# Patient Record
Sex: MALE | Race: WHITE | NOT HISPANIC OR LATINO | Employment: OTHER | URBAN - METROPOLITAN AREA
[De-identification: names, ages, dates, MRNs, and addresses within clinical notes are randomized per-mention and may not be internally consistent; named-entity substitution may affect disease eponyms.]

---

## 2022-03-31 ENCOUNTER — TELEPHONE (OUTPATIENT)
Dept: NEUROSURGERY | Facility: CLINIC | Age: 67
End: 2022-03-31

## 2022-03-31 NOTE — TELEPHONE ENCOUNTER
Called patient to schedule NP appointment with Beatrice Dumont per referral from Dr. Hoyos.  No answer.  Left voicemail.

## 2022-04-29 ENCOUNTER — OFFICE VISIT (OUTPATIENT)
Dept: NEUROSURGERY | Facility: CLINIC | Age: 67
End: 2022-04-29
Payer: MEDICARE

## 2022-04-29 VITALS — WEIGHT: 227.06 LBS | HEART RATE: 67 BPM | HEIGHT: 68 IN | RESPIRATION RATE: 18 BRPM | BODY MASS INDEX: 34.41 KG/M2

## 2022-04-29 DIAGNOSIS — M54.16 LUMBAR RADICULOPATHY, CHRONIC: ICD-10-CM

## 2022-04-29 DIAGNOSIS — Z98.890 HISTORY OF LUMBAR LAMINECTOMY: Primary | ICD-10-CM

## 2022-04-29 DIAGNOSIS — Z98.1 HISTORY OF LUMBAR SPINAL FUSION: ICD-10-CM

## 2022-04-29 PROCEDURE — 99204 OFFICE O/P NEW MOD 45 MIN: CPT | Mod: S$PBB,,, | Performed by: PHYSICIAN ASSISTANT

## 2022-04-29 PROCEDURE — 99204 PR OFFICE/OUTPT VISIT, NEW, LEVL IV, 45-59 MIN: ICD-10-PCS | Mod: S$PBB,,, | Performed by: PHYSICIAN ASSISTANT

## 2022-04-29 NOTE — PROGRESS NOTES
[facility]  Clinic Consult     Consult Requested By: Angelito Vasquez  PCP: Boaz Hoyos DO    SUBJECTIVE:     Chief Complaint:   Chief Complaint   Patient presents with    Back Pain     LBP pain right side of back radiating down right leg. Left leg numb to the foot; left leg gives away causing falls.       History of Present Illness:  Chau Price is a 66 y.o. male with HTN who presents for evaluation of back and leg pain. Patient has a history of cervical fusion x2 and lumbar surgery x3. He reports he initially had a lumbar laminectomy. He was discharged same day and fell at home that night. He required a revision surgery the following day. He underwent lumbar fusion approximately 1.5 years ago. He believes it is L4-5. He reports he underwent stand alone ALIF. He reports prior to this surgery he was experiencing left leg symptoms. He continues to have numbness in the left leg, mostly the foot since before this surgery. He now experiences low back pain with radiation into bilateral legs. The leg pain is worse at night and will wake him up several times throughout the night. He reports the pain is tolerable during the day. He reports numbness/tingling in the right leg, mostly foot. He reports his left leg will give out on him. Denies bowel/bladder dysfunction. He is currently attending PT. He reports everything hurts while doing therapy. He takes naproxen and sometimes hydrocodone. He has failed to improve with gabapentin and Lyrica. He last received injections before his most recent surgery.     Pertinent and recent history, provider evaluations, imaging and data reviewed in EPIC        Past Medical History:   Diagnosis Date    Chronic bilateral low back pain without sciatica     Hypertension     Other ulcerative colitis with rectal bleeding      Past Surgical History:   Procedure Laterality Date    BACK SURGERY      ROTATOR CUFF REPAIR       Family  History   Problem Relation Age of Onset    Cancer Mother         lung    Cancer Father         bladder     Social History     Tobacco Use    Smoking status: Former Smoker     Types: Cigars    Smokeless tobacco: Never Used   Substance Use Topics    Alcohol use: Yes     Comment: social      Review of patient's allergies indicates:   Allergen Reactions    Azathioprine        Current Outpatient Medications:     ALPRAZolam (XANAX) 1 MG tablet, alprazolam 1 mg tablet  TAKE 1 TABLET BY MOUTH THREE TIMES DAILY AS NEEDED, Disp: , Rfl:     augmented betamethasone dipropionate (DIPROLENE-AF) 0.05 % cream, betamethasone, augmented 0.05 % topical cream  APPLY AND RUB IN A THIN FILM TO AFFECTED AREAS TWICE DAILY, Disp: , Rfl:     fluticasone propionate (CUTIVATE) 0.05 % cream, fluticasone propionate 0.05 % topical cream  APPLY A VERY THIN LAYER TOPICALLY TO FACE TWICE DAILY AS NEEDED, Disp: , Rfl:     HYDROcodone-acetaminophen (NORCO) 5-325 mg per tablet, Norco 5 mg-325 mg tablet   1 tablet by oral route., Disp: , Rfl:     latanoprost 0.005 % ophthalmic solution, latanoprost 0.005 % eye drops  INSTILL 1 DROP INTO RIGHT EYE EVERY EVENING, Disp: , Rfl:     losartan (COZAAR) 100 MG tablet, losartan 100 mg tablet  TAKE 1 TABLET BY MOUTH EVERY DAY IN THE MORNING, Disp: 90 tablet, Rfl: 4    mesalamine (LIALDA) 1.2 gram TbEC, Take 4 tablets (4.8 g total) by mouth daily with breakfast., Disp: 360 tablet, Rfl: 4    metoprolol tartrate (LOPRESSOR) 50 MG tablet, metoprolol tartrate 50 mg tablet  TAKE 1 TABLET BY MOUTH TWICE DAILY, Disp: 180 tablet, Rfl: 4    naproxen (NAPROSYN) 500 MG tablet, TAKE 1 TABLET BY MOUTH EVERY 12 HOURS AS NEEDED, Disp: 180 tablet, Rfl: 1    predniSONE (DELTASONE) 10 MG tablet, prednisone 10 mg tablet, Disp: , Rfl:     tadalafiL (CIALIS) 5 MG tablet, tadalafil 5 mg tablet  TAKE 1 TABLET BY MOUTH DAILY, Disp: , Rfl:     Review of Systems:   Constitutional: no fever, chills or night sweats. No  "changes in weight   Eyes: no visual changes   ENT: no nasal congestion or sore throat   Respiratory: no cough or shortness of breath   Cardiovascular: no chest pain or palpitations   Gastrointestinal: no nausea or vomiting   Genitourinary: no hematuria or dysuria   Integument/Breast: no rash or pruritis   Hematologic/Lymphatic: no easy bruising or lymphadenopathy   Musculoskeletal: +back pain, leg pain   Neurological: no seizures or tremors +weakness, paresthesias   Behavioral/Psych: no auditory or visual hallucinations   Endocrine: no heat or cold intolerance         OBJECTIVE:     Vital Signs (Most Recent):  Pulse: 67 (04/29/22 1050)  Resp: 18 (04/29/22 1050)  Estimated body mass index is 34.53 kg/m² as calculated from the following:    Height as of this encounter: 5' 8" (1.727 m).    Weight as of this encounter: 103 kg (227 lb 1.2 oz).    Physical Exam:  General: well developed, well nourished, no distress.   Neurologic: Alert and oriented. Thought content appropriate. GCS 15.   Language: No aphasia  Speech: No dysarthria  Head: normocephalic, atraumatic  Eyes: EOMI.  Neck: trachea midline, no JVD   Cardiovascular: no LE edema  Pulmonary: normal respirations, no signs of respiratory distress  Abdomen: soft, non-distended  Sensory: intact to light touch throughout  Skin: Skin is warm, dry and intact.    Motor Strength: Moves all extremities spontaneously with good tone. No abnormal movements seen.     Strength  Deltoids Triceps Biceps Wrist Extension Wrist Flexion Hand  Interossei    Upper: R 5/5 5/5 5/5 5/5 5/5 5/5 5/5     L 5/5 5/5 5/5 5/5 5/5 5/5 5/5      Iliopsoas Quadriceps Knee  Flexion Tibialis  anterior Gastro- cnemius EHL     Lower: R 5/5 5/5 5/5 5/5 5/5 5/5      L 5/5 5/5 5/5 4+/5 5/5 3/5       DTR's: 2 +   Clonus: absent  Gait: normal       Difficulty standing on left heel         Diagnostic Results:  No imaging available for review     ASSESSMENT/PLAN:     History of lumbar laminectomy  -     MRI " Lumbar Spine W WO Contrast; Future; Expected date: 04/29/2022  -     X-Ray Lumbar Spine Ap Lateral w/Flex Ext; Future; Expected date: 04/29/2022  -     Creatinine, serum; Future; Expected date: 04/29/2022    Lumbar radiculopathy, chronic  -     Ambulatory referral/consult to Neurosurgery  -     MRI Lumbar Spine W WO Contrast; Future; Expected date: 04/29/2022  -     X-Ray Lumbar Spine Ap Lateral w/Flex Ext; Future; Expected date: 04/29/2022  -     Creatinine, serum; Future; Expected date: 04/29/2022    History of lumbar spinal fusion  -     MRI Lumbar Spine W WO Contrast; Future; Expected date: 04/29/2022  -     X-Ray Lumbar Spine Ap Lateral w/Flex Ext; Future; Expected date: 04/29/2022  -     Creatinine, serum; Future; Expected date: 04/29/2022        Chau Price is a 66 y.o. male with history of lumbar fusion and decompression with progression of back pain with radiculopathy. He has weakness in the left distal leg. Unsure if this was present before his last surgery. I recommend obtaining MRI lumbar spine to evaluate for adjacent segment disease. I have also ordered dynamic x-rays to evaluate fusion hardware and stability of alignment. I will review once complete and provide further recommendations. I will call patient to discuss.     Patient verbalized understanding of plan. Encouraged to call with any questions or concerns.

## 2022-06-08 ENCOUNTER — OFFICE VISIT (OUTPATIENT)
Dept: NEUROSURGERY | Facility: CLINIC | Age: 67
End: 2022-06-08
Payer: MEDICARE

## 2022-06-08 VITALS
RESPIRATION RATE: 18 BRPM | HEIGHT: 68 IN | HEART RATE: 85 BPM | SYSTOLIC BLOOD PRESSURE: 124 MMHG | DIASTOLIC BLOOD PRESSURE: 94 MMHG | BODY MASS INDEX: 33.34 KG/M2 | WEIGHT: 220 LBS

## 2022-06-08 DIAGNOSIS — M54.16 LUMBAR RADICULOPATHY, CHRONIC: ICD-10-CM

## 2022-06-08 DIAGNOSIS — Z98.1 HISTORY OF LUMBAR SPINAL FUSION: Primary | ICD-10-CM

## 2022-06-08 DIAGNOSIS — Z98.890 HISTORY OF LUMBAR LAMINECTOMY: ICD-10-CM

## 2022-06-08 PROCEDURE — 99214 PR OFFICE/OUTPT VISIT, EST, LEVL IV, 30-39 MIN: ICD-10-PCS | Mod: S$PBB,,, | Performed by: STUDENT IN AN ORGANIZED HEALTH CARE EDUCATION/TRAINING PROGRAM

## 2022-06-08 PROCEDURE — 99214 OFFICE O/P EST MOD 30 MIN: CPT | Mod: S$PBB,,, | Performed by: STUDENT IN AN ORGANIZED HEALTH CARE EDUCATION/TRAINING PROGRAM

## 2022-06-08 NOTE — PROGRESS NOTES
[facility]  Clinic Consult     Consult Requested By: No ref. provider found  PCP: Boaz Hoyos DO    SUBJECTIVE:     Chief Complaint:   Chief Complaint   Patient presents with    Lumbar Spine Pain (L-Spine)     Pain in both legs last night mostly on left leg. Numbness and tingling begins at hip and radiates to toes on left side. Also tingling in specific area on right calf.       History of Present Illness:  Chau Price is a 66 y.o. male with HTN who presents for evaluation of back and leg pain. Patient has a history of cervical fusion x2 and lumbar surgery x3. He reports he initially had a lumbar laminectomy. He was discharged same day and fell at home that night. He required a revision surgery the following day. He underwent lumbar fusion approximately 1.5 years ago. He believes it is L4-5. He reports he underwent stand alone ALIF. He reports prior to this surgery he was experiencing left leg symptoms. He continues to have numbness in the left leg, mostly the foot since before this surgery. He now experiences low back pain with radiation into bilateral legs. The leg pain is worse at night and will wake him up several times throughout the night. He reports the pain is tolerable during the day. He reports numbness/tingling in the right leg, mostly foot. He reports his left leg will give out on him. Denies bowel/bladder dysfunction. He is currently attending PT. He reports everything hurts while doing therapy. He takes naproxen and sometimes hydrocodone. He has failed to improve with gabapentin and Lyrica. He last received injections before his most recent surgery.     Pertinent and recent history, provider evaluations, imaging and data reviewed in EPIC        Past Medical History:   Diagnosis Date    Chronic bilateral low back pain without sciatica     Hypertension     Other ulcerative colitis with rectal bleeding      Past Surgical History:   Procedure  Laterality Date    BACK SURGERY      ROTATOR CUFF REPAIR       Family History   Problem Relation Age of Onset    Cancer Mother         lung    Cancer Father         bladder     Social History     Tobacco Use    Smoking status: Former Smoker     Types: Cigars    Smokeless tobacco: Never Used   Substance Use Topics    Alcohol use: Yes     Comment: social      Review of patient's allergies indicates:   Allergen Reactions    Azathioprine        Current Outpatient Medications:     ALPRAZolam (XANAX) 1 MG tablet, alprazolam 1 mg tablet  TAKE 1 TABLET BY MOUTH THREE TIMES DAILY AS NEEDED, Disp: , Rfl:     augmented betamethasone dipropionate (DIPROLENE-AF) 0.05 % cream, betamethasone, augmented 0.05 % topical cream  APPLY AND RUB IN A THIN FILM TO AFFECTED AREAS TWICE DAILY, Disp: , Rfl:     busPIRone (BUSPAR) 10 MG tablet, Take 10 mg by mouth 2 (two) times a day., Disp: , Rfl:     diphenoxylate-atropine 2.5-0.025 mg (LOMOTIL) 2.5-0.025 mg per tablet, Take 1 tablet by mouth 4 (four) times daily as needed for Diarrhea., Disp: 12 tablet, Rfl: 0    doxazosin (CARDURA) 4 MG tablet, Take 4 mg by mouth every evening., Disp: , Rfl:     fluticasone propionate (CUTIVATE) 0.05 % cream, fluticasone propionate 0.05 % topical cream  APPLY A VERY THIN LAYER TOPICALLY TO FACE TWICE DAILY AS NEEDED, Disp: , Rfl:     hydrALAZINE (APRESOLINE) 25 MG tablet, Take 25 mg by mouth 2 (two) times a day., Disp: , Rfl:     HYDROcodone-acetaminophen (NORCO) 5-325 mg per tablet, Norco 5 mg-325 mg tablet   1 tablet by oral route., Disp: , Rfl:     latanoprost 0.005 % ophthalmic solution, latanoprost 0.005 % eye drops  INSTILL 1 DROP INTO RIGHT EYE EVERY EVENING, Disp: , Rfl:     LORazepam (ATIVAN) 1 MG tablet, Take 1 tablet (1 mg total) by mouth every 8 (eight) hours as needed (withdrawl symptoms)., Disp: 15 tablet, Rfl: 0    losartan (COZAAR) 100 MG tablet, losartan 100 mg tablet  TAKE 1 TABLET BY MOUTH EVERY DAY IN THE MORNING,  Disp: 90 tablet, Rfl: 4    mesalamine (LIALDA) 1.2 gram TbEC, Take 4 tablets (4.8 g total) by mouth daily with breakfast., Disp: 360 tablet, Rfl: 4    metoprolol tartrate (LOPRESSOR) 50 MG tablet, metoprolol tartrate 50 mg tablet  TAKE 1 TABLET BY MOUTH TWICE DAILY, Disp: 180 tablet, Rfl: 4    naproxen (NAPROSYN) 500 MG tablet, TAKE 1 TABLET BY MOUTH EVERY 12 HOURS AS NEEDED, Disp: 180 tablet, Rfl: 1    ondansetron (ZOFRAN-ODT) 4 MG TbDL, Take 1 tablet (4 mg total) by mouth every 6 (six) hours as needed (nausea)., Disp: 20 tablet, Rfl: 0    pantoprazole (PROTONIX) 20 MG tablet, Take 20 mg by mouth once daily., Disp: , Rfl:     predniSONE (DELTASONE) 10 MG tablet, prednisone 10 mg tablet, Disp: , Rfl:     tadalafiL (CIALIS) 5 MG tablet, tadalafil 5 mg tablet  TAKE 1 TABLET BY MOUTH DAILY, Disp: , Rfl:     traZODone (DESYREL) 50 MG tablet, Take 50 mg by mouth every evening., Disp: , Rfl:     valACYclovir (VALTREX) 500 MG tablet, Take 500 mg by mouth once daily at 6am., Disp: , Rfl:     valACYclovir (VALTREX) 500 MG tablet, Take 1 tablet (500 mg total) by mouth once daily., Disp: 30 tablet, Rfl: 0    Review of Systems:   Constitutional: no fever, chills or night sweats. No changes in weight   Eyes: no visual changes   ENT: no nasal congestion or sore throat   Respiratory: no cough or shortness of breath   Cardiovascular: no chest pain or palpitations   Gastrointestinal: no nausea or vomiting   Genitourinary: no hematuria or dysuria   Integument/Breast: no rash or pruritis   Hematologic/Lymphatic: no easy bruising or lymphadenopathy   Musculoskeletal: +back pain, leg pain   Neurological: no seizures or tremors +weakness, paresthesias   Behavioral/Psych: no auditory or visual hallucinations   Endocrine: no heat or cold intolerance         OBJECTIVE:     Vital Signs (Most Recent):  Pulse: 85 (06/08/22 1058)  Resp: 18 (06/08/22 1058)  BP: (!) 124/94 (06/08/22 1058)  Estimated body mass index is 33.45 kg/m² as  "calculated from the following:    Height as of this encounter: 5' 8" (1.727 m).    Weight as of this encounter: 99.8 kg (220 lb 0.3 oz).    Physical Exam:  General: well developed, well nourished, no distress.   Neurologic: Alert and oriented. Thought content appropriate. GCS 15.   Language: No aphasia  Speech: No dysarthria  Head: normocephalic, atraumatic  Eyes: EOMI.  Neck: trachea midline, no JVD   Cardiovascular: no LE edema  Pulmonary: normal respirations, no signs of respiratory distress  Abdomen: soft, non-distended  Sensory: intact to light touch throughout  Skin: Skin is warm, dry and intact.    Motor Strength: Moves all extremities spontaneously with good tone. No abnormal movements seen.     Strength  Deltoids Triceps Biceps Wrist Extension Wrist Flexion Hand  Interossei    Upper: R 5/5 5/5 5/5 5/5 5/5 5/5 5/5     L 5/5 5/5 5/5 5/5 5/5 5/5 5/5      Iliopsoas Quadriceps Knee  Flexion Tibialis  anterior Gastro- cnemius EHL     Lower: R 5/5 5/5 5/5 5/5 5/5 5/5      L 5/5 5/5 5/5 4+/5 5/5 3/5       DTR's: 2 +   Clonus: absent  Gait: normal       Difficulty standing on left heel         Diagnostic Results:  No imaging available for review     MRI reviewed.  Patient has at L4-5 a lift her previous posterior decompression L4-S1 residual severe facet arthropathy and transforaminal stenosis L5-S1 on the right, moderate adjacent segment disease at L3-4 with a left-sided disc osteophyte bulge along the lateral recess and proximal foramen with stenosis    ASSESSMENT/PLAN:     There are no diagnoses linked to this encounter.    Chau Price is a 66 y.o. male with history of lumbar fusion and decompression with progression of back pain with radiculopathy. He has weakness in the left distal leg. Unsure if this was present before his last surgery. I recommend obtaining MRI lumbar spine to evaluate for adjacent segment disease. I have also ordered dynamic x-rays to evaluate fusion hardware and stability of " alignment. I will review once complete and provide further recommendations. I will call patient to discuss.     \  Assessment plan today  Patient presents with complex long history he has had 3 spine surgeries the last 1 was at L4-5 a lift.  None of these relieved his chief concern was for back and leg pain right and left left leg is worse he has leg weakness and give out.  This proceeded his surgery after the 1st 1 he had a fall weakness and a decompression which did not get better.  I do not have old imaging for review her his chronic findings of previous decompression 5 1 with degenerative disc disease and right-sided transforaminal stenosis, at L3-4 moderate adjacent segment disease with a disc osteophyte bulge off to the left  He has stable symptoms with goals of symptom management.  I am not confident he would improve with surgery at this point he does have old images which showed drop up for review.  He has no significant central stenosis and is chronic symptoms.  He like to be evaluated by Pain Management for treatment options.  He is currently working physical therapy is reasonable goals her  We reviewed all images together as well as treatment options his goals                   Patient verbalized understanding of plan. Encouraged to call with any questions or concerns.

## 2022-06-13 ENCOUNTER — TELEPHONE (OUTPATIENT)
Dept: OPHTHALMOLOGY | Facility: CLINIC | Age: 67
End: 2022-06-13
Payer: MEDICARE

## 2022-06-13 PROBLEM — M54.41 CHRONIC BILATERAL LOW BACK PAIN WITH BILATERAL SCIATICA: Status: ACTIVE | Noted: 2022-06-13

## 2022-06-13 PROBLEM — F10.21 HISTORY OF ALCOHOLISM: Status: ACTIVE | Noted: 2022-06-13

## 2022-06-13 PROBLEM — H40.10X0 OPEN-ANGLE GLAUCOMA OF BOTH EYES: Status: ACTIVE | Noted: 2022-06-13

## 2022-06-13 PROBLEM — F51.04 PSYCHOPHYSIOLOGICAL INSOMNIA: Status: ACTIVE | Noted: 2022-06-13

## 2022-06-13 PROBLEM — G89.29 CHRONIC BILATERAL LOW BACK PAIN WITH BILATERAL SCIATICA: Status: ACTIVE | Noted: 2022-06-13

## 2022-06-13 PROBLEM — M54.40 ACUTE BILATERAL LOW BACK PAIN WITH SCIATICA: Status: ACTIVE | Noted: 2022-06-13

## 2022-06-13 PROBLEM — M54.42 CHRONIC BILATERAL LOW BACK PAIN WITH BILATERAL SCIATICA: Status: ACTIVE | Noted: 2022-06-13

## 2022-06-13 PROBLEM — A60.00 HERPES SIMPLEX INFECTION OF GENITOURINARY SYSTEM: Status: ACTIVE | Noted: 2022-06-13

## 2022-06-13 PROBLEM — I10 MALIGNANT HYPERTENSION: Status: ACTIVE | Noted: 2022-06-13

## 2022-06-13 NOTE — TELEPHONE ENCOUNTER
----- Message from Michaelle Miller sent at 6/13/2022  3:27 PM CDT -----  Contact: pt  Who Called: PT  Regarding: The pt is calling to schedule his initial appointment and is requesting a callback. He has a referral ready for scheduling.   Would the patient rather a call back or a response via [facility]? Call back  Best Call Back Number: [phone]  Additional Information:

## 2022-06-15 ENCOUNTER — TELEPHONE (OUTPATIENT)
Dept: NEUROSURGERY | Facility: CLINIC | Age: 67
End: 2022-06-15
Payer: MEDICARE

## 2022-06-15 NOTE — TELEPHONE ENCOUNTER
----- Message from Javed Gian sent at 6/15/2022  3:38 PM CDT -----  Regarding: ret call  Type:  Patient Returning Call    Who Called:  Chau    Who Left Message for Patient:  Minnie    Does the patient know what this is regarding?:  handicap tag    Best Call Back Number:  [phone]     Additional Information:  pt states dr an is sending back to dr naranjo to get tag. Pt needs to know how to proceed since both providers are saying they dont do.

## 2022-06-15 NOTE — TELEPHONE ENCOUNTER
Patient informed to contact pcp to discuss. Our office does not fill out handicap unless during the breif post operative period. Patient states understanding.

## 2022-06-18 ENCOUNTER — TELEPHONE (OUTPATIENT)
Dept: RADIOLOGY | Facility: HOSPITAL | Age: 67
End: 2022-06-18
Payer: MEDICARE

## 2022-07-05 PROBLEM — Z12.5 SCREENING PSA (PROSTATE SPECIFIC ANTIGEN): Status: ACTIVE | Noted: 2022-07-05

## 2022-07-05 PROBLEM — F32.A ANXIETY AND DEPRESSION: Status: ACTIVE | Noted: 2022-07-05

## 2022-07-05 PROBLEM — Z79.899 ENCOUNTER FOR LONG-TERM (CURRENT) USE OF MEDICATIONS: Status: ACTIVE | Noted: 2022-07-05

## 2022-07-05 PROBLEM — F41.9 ANXIETY AND DEPRESSION: Status: ACTIVE | Noted: 2022-07-05

## 2022-07-05 PROBLEM — Z00.00 ANNUAL PHYSICAL EXAM: Status: ACTIVE | Noted: 2022-07-05

## 2022-09-29 ENCOUNTER — OFFICE VISIT (OUTPATIENT)
Dept: PAIN MEDICINE | Facility: CLINIC | Age: 67
End: 2022-09-29
Payer: MEDICARE

## 2022-09-29 DIAGNOSIS — Z98.1 HISTORY OF LUMBAR SPINAL FUSION: ICD-10-CM

## 2022-09-29 DIAGNOSIS — M54.16 LUMBAR RADICULOPATHY: Primary | ICD-10-CM

## 2022-09-29 PROCEDURE — 99999 PR PBB SHADOW E&M-EST. PATIENT-LVL IV: ICD-10-PCS | Mod: PBBFAC,,, | Performed by: ANESTHESIOLOGY

## 2022-09-29 PROCEDURE — 99999 PR PBB SHADOW E&M-EST. PATIENT-LVL IV: CPT | Mod: PBBFAC,,, | Performed by: ANESTHESIOLOGY

## 2022-09-29 PROCEDURE — 99204 OFFICE O/P NEW MOD 45 MIN: CPT | Mod: S$PBB,,, | Performed by: ANESTHESIOLOGY

## 2022-09-29 PROCEDURE — 99214 OFFICE O/P EST MOD 30 MIN: CPT | Mod: PBBFAC,PN | Performed by: ANESTHESIOLOGY

## 2022-09-29 PROCEDURE — 99204 PR OFFICE/OUTPT VISIT, NEW, LEVL IV, 45-59 MIN: ICD-10-PCS | Mod: S$PBB,,, | Performed by: ANESTHESIOLOGY

## 2022-09-29 RX ORDER — ALPRAZOLAM 0.25 MG/1
TABLET ORAL
COMMUNITY
End: 2023-01-20

## 2022-09-29 RX ORDER — HYDROCORTISONE BUTYRATE 1 MG/ML
LOTION TOPICAL
COMMUNITY
End: 2023-08-17 | Stop reason: SDUPTHER

## 2022-09-29 RX ORDER — MONTELUKAST SODIUM 10 MG/1
TABLET ORAL
COMMUNITY
End: 2023-01-20

## 2022-09-29 RX ORDER — PROMETHAZINE HYDROCHLORIDE AND DEXTROMETHORPHAN HYDROBROMIDE 6.25; 15 MG/5ML; MG/5ML
SYRUP ORAL
COMMUNITY
End: 2023-01-20

## 2022-09-29 RX ORDER — ASCORBIC ACID 500 MG
500 TABLET ORAL DAILY
COMMUNITY

## 2022-09-29 RX ORDER — ALPRAZOLAM 0.5 MG/1
1 TABLET, ORALLY DISINTEGRATING ORAL ONCE AS NEEDED
Status: CANCELLED | OUTPATIENT
Start: 2022-10-12 | End: 2034-03-09

## 2022-09-29 RX ORDER — CLOBETASOL PROPIONATE 0.5 MG/G
CREAM TOPICAL
COMMUNITY

## 2022-09-29 RX ORDER — PREDNISONE 20 MG/1
TABLET ORAL
COMMUNITY
End: 2023-01-20

## 2022-09-29 RX ORDER — DOXAZOSIN 4 MG/1
TABLET ORAL
COMMUNITY
End: 2022-12-19

## 2022-09-29 RX ORDER — NAPROXEN 500 MG/1
500 TABLET ORAL 2 TIMES DAILY PRN
COMMUNITY

## 2022-09-29 RX ORDER — AMOXICILLIN 875 MG/1
TABLET, FILM COATED ORAL
COMMUNITY
End: 2023-01-20

## 2022-09-29 RX ORDER — HYDROCODONE BITARTRATE AND ACETAMINOPHEN 7.5; 325 MG/1; MG/1
TABLET ORAL
COMMUNITY
End: 2022-10-06 | Stop reason: SDUPTHER

## 2022-09-29 RX ORDER — GABAPENTIN 800 MG/1
TABLET ORAL
COMMUNITY
End: 2023-01-20

## 2022-09-29 RX ORDER — MINERAL OIL
180 ENEMA (ML) RECTAL DAILY
COMMUNITY

## 2022-09-29 RX ORDER — MINOCYCLINE HYDROCHLORIDE 100 MG/1
CAPSULE ORAL
COMMUNITY
End: 2023-01-20

## 2022-09-29 NOTE — H&P (VIEW-ONLY)
This note was completed with dictation software and grammatical errors may exist.    Chief Complaint   Patient presents with    Hip Pain    Leg Pain    Ankle Pain    Foot Pain        HPI: Chau Price is a 66 y.o. year old male patient who has a past medical history of Other ulcerative colitis with rectal bleeding. He presents in referral from Dr. René Christine for back and leg pain.  The patient reports having left leg pain for the last 4 years, has undergone several surgeries and actually had undergone surgery at L4/5 about 1.5 years ago after which he reports his back pain is better but his left leg pain  And numbness still continues.  Feels that he actually has some slight weakness in his left foot.  He has been in physical therapy over the last year and continues to do exercises on his own.  Describes his pain as numb, sharp, shooting.  It starts at the hip and radiates to the ankle.  Is worse with lying down, he actually does better with standing and with some medications.  He denies any bowel or bladder incontinence.      Pain intervention history:  Had undergone some injections prior to his surgery.    Spine surgeries:  Cervical surgery x2, lumbar surgery x3, the last was about 1.5 years ago.    Antineuropathics:  Has failed gabapentin and Lyrica in the past  NSAIDs:  Physical therapy:  Antidepressants:  Muscle relaxers:  Celexa 20 mg, BuSpar 10 mg, trazodone 50 mg  Opioids:  Antiplatelets/Anticoagulants:        ROS:  He reports joint stiffness, difficulty sleeping.  Balance of review of systems is negative.    No results found for: LABA1C, HGBA1C    Lab Results   Component Value Date    WBC 5.64 07/01/2022    HGB 13.7 (L) 07/01/2022    HCT 40.0 07/01/2022    MCV 99 (H) 07/01/2022     (L) 07/01/2022             Past Medical History:   Diagnosis Date    Other ulcerative colitis with rectal bleeding        Past Surgical History:   Procedure Laterality Date    BACK SURGERY      ROTATOR CUFF  REPAIR         Social History     Socioeconomic History    Marital status:    Tobacco Use    Smoking status: Former     Types: Cigars    Smokeless tobacco: Never   Substance and Sexual Activity    Alcohol use: Yes     Comment: social         Medications/Allergies: See med card    There were no vitals filed for this visit.  There is no height or weight on file to calculate BMI.    Physical exam:    Gen: A and O x3, pleasant, well-groomed  Skin: No rashes or obvious lesions  HEENT: PERRLA, no obvious deformities on ears or in canals. Trachea midline.  CVS: Regular rate and rhythm, normal palpable pulses.  Resp:No increased work of breathing, symmetrical chest rise.  Abdomen: Soft, NT/ND.  Musculoskeletal: No antalgic gait.         Neuro:    Iliopsoas Quadriceps Knee  Flexion Tibialis  anterior Gastro- cnemius EHL   Lower: R 5/5 5/5 5/5 5/5 5/5 5/5    L 5/5 5/5 5/5 4/5 5/5 4/5      Left  Right    Patellar DTR 1+ 1+   Achilles DTR 0+ 1+                    Intact and symmetrical to light touch and pinprick in L1-S1 dermatomes bilaterally.    Lumbar spine:  Lumbar spine: ROM is   Moderately reduced with flexion extension and oblique extension with no increased pain.    Olaf's test causes no increased pain on either side.    Supine straight leg raise is  positive for worsening left leg pain.  Internal and external rotation of the hip causes no increased pain on either side.  Myofascial exam: No tenderness to palpation across lumbar paraspinous muscles.    Imagin22 MRI L-spine:  There is a minimal retrolisthesis of L3 on L4.  There are no acute fractures.  There is a mild chronic compression of the superior endplate of L3.   There are postoperative changes from anterior interbody lumbar fusion L4-5 disc space.  There is presence of a spacer in a satisfactory position.  Decompressive laminectomies with resection of the spinous process of L4 and L5.   The tip of the conus medullaris is at L1 level.  Mild  dextroscoliosis of the mid to upper lumbar spine.  Paraspinal soft tissues are unremarkable.   L5-S1: Disc degeneration with disc space narrowing.  There is a broad-based posterior osteophyte and disc complex greater on the right compared to the left with mild compression of thecal sac greater on the right.  No significant central canal spinal stenosis.  There is facet arthropathy decompressive laminectomy noted.  No abnormal intradural enhancement.  The in the far lateral right neural foramen there is an osteophyte and a disc bulge complex that contacts the inferior surface of the exiting right L5 root (image 10 series 3).  Clinical correlation for right L5 radiculopathy suggested.  Left neural foramen is unremarkable.   L4-5: Postoperative changes from anterior interbody lumbar fusion.  No recurrent disc herniations or central canal spinal stenosis.  There is enhancing scar the in the right lateral recess.  Stable postoperative changes from decompressive laminectomy.  Degenerative facet arthropathy bilaterally.   L3-4: Approximately 2 mm retrolisthesis of L3 on L4.  Disc space narrowing associated with disc degeneration.  There is a broad-based central and left paracentral disc protrusion/herniation resulting in left lateral recess stenosis (image 28 series 12.  There is also at least a moderate left L3-4 foraminal stenosis.  Compression of the exiting left L3 root in the neural foramen is not excluded.  Right neural foramen is unremarkable.  There is mild facet arthropathy.   L2-3: Disc degeneration with circumferential annular disc bulge.  No significant disc herniations or significant central canal spinal stenosis.  There is facet arthropathy.   L1-2: There is a mild left paracentral disc bulge/protrusion.  No significant central canal spinal stenosis.  There is facet arthropathy.  Neural foramina are unremarkable.   T12-L1: Disc degeneration with disc space narrowing.  In addition to the circumferential mild  annular disc bulge there is a left paracentral disc protrusion/disc herniation resulting in at least a mild left lateral recess stenosis (image 4 series 12).  Mild bilateral foraminal stenosis.     5/17/22 Xray L-spine:  There are no prior studies for comparison at this time.  There are postoperative changes from anterior interbody lumbar fusion.  Hardware in satisfactory position.  No subluxations is noted.   On flexion and extension radiographs there is no significant instability at all intervertebral disc spaces.   here is mild depression of the superior endplate of L3, likely from a chronic mild compression fracture.  There is degenerative disc disease also at the L2-3 disc space and L3-4 and L5-S1 disc spaces.  Mild marginal anterior spondylotic osteophytes throughout the lumbar spine.   There is calcific atherosclerotic changes of the abdominal aorta.  There is also disc degeneration at the T11-T12 disc space associated with circumferential mild annular disc bulge    Assessment:  Chau Price is a 66 y.o. year old male patient who has a past medical history of Other ulcerative colitis with rectal bleeding. He presents in referral from Dr. René Christine for back and leg pain.    1. Lumbar radiculopathy  Vital signs    Verify informed consent    Notify physician     Notify physician     Notify physician (specify)    Diet NPO    Case Request Operating Room: Injection,steroid,epidural,transforaminal approach L4/5+L5/S1    Place in Outpatient    alprazolam ODT dissolvable tablet 1 mg      2. History of lumbar spinal fusion  Ambulatory referral/consult to Pain Clinic          Plan:    1.  We reviewed his symptoms, reviewed his lumbar spine MRI.  He does appear to have some possible disc material or scar tissue to the left at L4/5 which may contact the descending L5 nerve root.  I am going to set him up with a left L4/5 transforaminal injection but also include the L5/S1 level for the descending L5 nerve  root.  I will have him follow up in several weeks after the injection.  If he is not having significant benefit, spinal cord stimulation may need to be an option.  If he does have some relief with this, he can continue strengthening to see how much of this will recover.    Thank you for referring this interesting patient, and I look forward to continuing to collaborate in his care.

## 2022-10-07 ENCOUNTER — TELEPHONE (OUTPATIENT)
Dept: PAIN MEDICINE | Facility: CLINIC | Age: 67
End: 2022-10-07
Payer: MEDICARE

## 2022-10-07 NOTE — TELEPHONE ENCOUNTER
----- Message from Mindy Mcleod sent at 10/7/2022  3:39 PM CDT -----  Type: Patient Returning Call      Who Called: Patient   Who Left Message for Patient: NA    Does the patient know what this is regarding?: Patient has transportation issues and has a procedure with the provider on 10/12/2022. Would like a call back to see about rescheduling to Friday 10/14/2022 instead (if possible).      Would the patient rather a call back or a response via [facility]? Call  Best Call Back Number: [phone]  Additional Information: Please assist, thank you!

## 2022-10-07 NOTE — TELEPHONE ENCOUNTER
Call placed to Pt in regards to upcoming procedure on 10-12-22. Pt states that he is having issues with a . If he is able to proceed without local sedation he would like to do so. If not, will need to reschedule. Message forwarded to    Dr. Travis for review.

## 2022-10-10 PROBLEM — Z00.00 ANNUAL PHYSICAL EXAM: Status: RESOLVED | Noted: 2022-07-05 | Resolved: 2022-10-10

## 2022-10-18 ENCOUNTER — PATIENT MESSAGE (OUTPATIENT)
Dept: OPTOMETRY | Facility: CLINIC | Age: 67
End: 2022-10-18
Payer: MEDICARE

## 2022-10-21 ENCOUNTER — HOSPITAL ENCOUNTER (OUTPATIENT)
Dept: RADIOLOGY | Facility: HOSPITAL | Age: 67
Discharge: HOME | End: 2022-10-21
Attending: ANESTHESIOLOGY
Payer: MEDICARE

## 2022-10-21 ENCOUNTER — HOSPITAL ENCOUNTER (OUTPATIENT)
Facility: HOSPITAL | Age: 67
Discharge: HOME | End: 2022-10-21
Attending: ANESTHESIOLOGY | Admitting: ANESTHESIOLOGY
Payer: MEDICARE

## 2022-10-21 VITALS
BODY MASS INDEX: 31.83 KG/M2 | RESPIRATION RATE: 14 BRPM | WEIGHT: 210 LBS | DIASTOLIC BLOOD PRESSURE: 92 MMHG | HEIGHT: 68 IN | HEART RATE: 62 BPM | TEMPERATURE: 97 F | OXYGEN SATURATION: 97 % | SYSTOLIC BLOOD PRESSURE: 168 MMHG

## 2022-10-21 DIAGNOSIS — M54.16 LUMBAR RADICULOPATHY: ICD-10-CM

## 2022-10-21 DIAGNOSIS — Z98.1 HISTORY OF LUMBAR SPINAL FUSION: ICD-10-CM

## 2022-10-21 DIAGNOSIS — M54.50 LOWER BACK PAIN: ICD-10-CM

## 2022-10-21 PROCEDURE — 64484 PRA INJECT ANES/STEROID FORAMEN LUMBAR/SACRAL W IMG GUIDE ,EA ADD LEVEL: ICD-10-PCS | Mod: LT,,, | Performed by: ANESTHESIOLOGY

## 2022-10-21 PROCEDURE — 25500020 PHARM REV CODE 255: Mod: PO | Performed by: ANESTHESIOLOGY

## 2022-10-21 PROCEDURE — 25000003 PHARM REV CODE 250: Mod: PO | Performed by: ANESTHESIOLOGY

## 2022-10-21 PROCEDURE — 63600175 PHARM REV CODE 636 W HCPCS: Mod: PO | Performed by: ANESTHESIOLOGY

## 2022-10-21 PROCEDURE — 64483 NJX AA&/STRD TFRM EPI L/S 1: CPT | Mod: PO | Performed by: ANESTHESIOLOGY

## 2022-10-21 PROCEDURE — 64484 NJX AA&/STRD TFRM EPI L/S EA: CPT | Mod: PO | Performed by: ANESTHESIOLOGY

## 2022-10-21 PROCEDURE — 64483 PR EPIDURAL INJ, ANES/STEROID, TRANSFORAMINAL, LUMB/SACR, SNGL LEVL: ICD-10-PCS | Mod: LT,,, | Performed by: ANESTHESIOLOGY

## 2022-10-21 PROCEDURE — 76000 FLUOROSCOPY <1 HR PHYS/QHP: CPT | Mod: TC,PO

## 2022-10-21 PROCEDURE — 64484 NJX AA&/STRD TFRM EPI L/S EA: CPT | Mod: LT,,, | Performed by: ANESTHESIOLOGY

## 2022-10-21 PROCEDURE — 64483 NJX AA&/STRD TFRM EPI L/S 1: CPT | Mod: LT,,, | Performed by: ANESTHESIOLOGY

## 2022-10-21 RX ORDER — ALPRAZOLAM 0.5 MG/1
1 TABLET, ORALLY DISINTEGRATING ORAL ONCE AS NEEDED
Status: COMPLETED | OUTPATIENT
Start: 2022-10-21 | End: 2022-10-21

## 2022-10-21 RX ORDER — BUPIVACAINE HYDROCHLORIDE 5 MG/ML
INJECTION, SOLUTION EPIDURAL; INTRACAUDAL
Status: DISCONTINUED | OUTPATIENT
Start: 2022-10-21 | End: 2022-10-21 | Stop reason: HOSPADM

## 2022-10-21 RX ORDER — LIDOCAINE HYDROCHLORIDE 10 MG/ML
INJECTION, SOLUTION EPIDURAL; INFILTRATION; INTRACAUDAL; PERINEURAL
Status: DISCONTINUED | OUTPATIENT
Start: 2022-10-21 | End: 2022-10-21 | Stop reason: HOSPADM

## 2022-10-21 RX ORDER — METHYLPREDNISOLONE ACETATE 80 MG/ML
INJECTION, SUSPENSION INTRA-ARTICULAR; INTRALESIONAL; INTRAMUSCULAR; SOFT TISSUE
Status: DISCONTINUED | OUTPATIENT
Start: 2022-10-21 | End: 2022-10-21 | Stop reason: HOSPADM

## 2022-10-21 RX ADMIN — ALPRAZOLAM 1 MG: 0.5 TABLET, ORALLY DISINTEGRATING ORAL at 09:10

## 2022-10-21 NOTE — DISCHARGE SUMMARY
Con - Surgery  Discharge Note  Short Stay    Procedure(s) (LRB):  Injection,steroid,epidural,transforaminal approach L4/5+L5/S1 (Left)      OUTCOME: Patient tolerated treatment/procedure well without complication and is now ready for discharge.    DISPOSITION: Home or Self Care    FINAL DIAGNOSIS:  Lumbar radiculopathy    FOLLOWUP: In clinic    DISCHARGE INSTRUCTIONS:    Discharge Procedure Orders   Diet Adult Regular     No dressing needed     Notify your health care provider if you experience any of the following:  temperature >100.4     Activity as tolerated

## 2022-10-21 NOTE — OP NOTE
PROCEDURE DATE: 10/21/2022    PROCEDURE: LeftL4/5 and L5/S1 transforaminal epidural steroid injection under fluoroscopy    DIAGNOSIS: Lumbar  Radiculopathy    Post op diagnosis: Same    PHYSICIAN: Diogo Travis MD    MEDICATIONS INJECTED:  Methylprednisolone 40mg (1ml) and 1ml 0.25% bupivicaine at each nerve root.     LOCAL ANESTHETIC INJECTED:  Lidocaine 1%. 4 ml per site.    SEDATION MEDICATIONS: none    ESTIMATED BLOOD LOSS:  none    COMPLICATIONS:  none    TECHNIQUE:   A time-out was taken to identify patient and procedure side prior to starting the procedure. The patient was placed in a prone position, prepped and draped in the usual sterile fashion using ChloraPrep and sterile towels.  The area to be injected was determined under fluoroscopic guidance in AP and oblique view.  Local anesthetic was given by raising a wheal and going down to the hub of a 25-gauge 1.5 inch needle.  In oblique view, a 5 inch 22-gauge bent-tip spinal needle was introduced towards 6 oclock position of the pedicle of each above named nerve root level.  The needle was walked medially then hinged into the neural foramen and position was confirmed in AP and lateral views.  Omnipaque contrast dye was injected to confirm appropriate placement and that there was no vascular uptake.  After negative aspiration for blood or CSF, the medication was then injected. This was performed at the left L4/5 and L5/S1 level(s). The patient tolerated the procedure well.    The patient was monitored after the procedure.  Patient was given post procedure and discharge instructions to follow at home. The patient was discharged in a stable condition.

## 2022-12-15 ENCOUNTER — OFFICE VISIT (OUTPATIENT)
Dept: PAIN MEDICINE | Facility: CLINIC | Age: 67
End: 2022-12-15
Payer: MEDICARE

## 2022-12-15 ENCOUNTER — HOSPITAL ENCOUNTER (OUTPATIENT)
Dept: RADIOLOGY | Facility: HOSPITAL | Age: 67
Discharge: HOME | End: 2022-12-15
Payer: MEDICARE

## 2022-12-15 VITALS
HEART RATE: 60 BPM | BODY MASS INDEX: 32.76 KG/M2 | DIASTOLIC BLOOD PRESSURE: 103 MMHG | OXYGEN SATURATION: 98 % | WEIGHT: 216.19 LBS | HEIGHT: 68 IN | SYSTOLIC BLOOD PRESSURE: 203 MMHG

## 2022-12-15 DIAGNOSIS — Z98.1 HISTORY OF FUSION OF CERVICAL SPINE: ICD-10-CM

## 2022-12-15 DIAGNOSIS — M54.2 CERVICALGIA: ICD-10-CM

## 2022-12-15 DIAGNOSIS — Z98.1 HISTORY OF LUMBAR SPINAL FUSION: ICD-10-CM

## 2022-12-15 DIAGNOSIS — M54.16 LUMBAR RADICULOPATHY: ICD-10-CM

## 2022-12-15 DIAGNOSIS — M54.2 CERVICALGIA: Primary | ICD-10-CM

## 2022-12-15 PROCEDURE — 72052 X-RAY EXAM NECK SPINE 6/>VWS: CPT | Mod: TC,PO

## 2022-12-15 PROCEDURE — 72052 XR CERVICAL SPINE 5 VIEW WITH FLEX AND EXT: ICD-10-PCS | Mod: 26,,, | Performed by: RADIOLOGY

## 2022-12-15 PROCEDURE — 99999 PR PBB SHADOW E&M-EST. PATIENT-LVL IV: CPT | Mod: PBBFAC,,,

## 2022-12-15 PROCEDURE — 99999 PR PBB SHADOW E&M-EST. PATIENT-LVL IV: ICD-10-PCS | Mod: PBBFAC,,,

## 2022-12-15 PROCEDURE — 99214 OFFICE O/P EST MOD 30 MIN: CPT | Mod: S$PBB,,,

## 2022-12-15 PROCEDURE — 72052 X-RAY EXAM NECK SPINE 6/>VWS: CPT | Mod: 26,,, | Performed by: RADIOLOGY

## 2022-12-15 PROCEDURE — 99214 OFFICE O/P EST MOD 30 MIN: CPT | Mod: PBBFAC,PN

## 2022-12-15 PROCEDURE — 99214 PR OFFICE/OUTPT VISIT, EST, LEVL IV, 30-39 MIN: ICD-10-PCS | Mod: S$PBB,,,

## 2022-12-15 RX ORDER — AMITRIPTYLINE HYDROCHLORIDE 10 MG/1
10 TABLET, FILM COATED ORAL NIGHTLY PRN
Qty: 30 TABLET | Refills: 1 | Status: SHIPPED | OUTPATIENT
Start: 2022-12-15 | End: 2023-01-20

## 2022-12-15 NOTE — PROGRESS NOTES
This note was completed with dictation software and grammatical errors may exist.    Chief Complaint   Patient presents with    Post-op Evaluation        HPI: Chau Price is a 67 y.o. year old male patient who has a past medical history of Other ulcerative colitis with rectal bleeding. He presents in referral from No ref. provider found for back and leg pain.  He is status post left L4/5 and L5/S1 transforaminal [place]D on 11/21/2022 with no significant relief.  Today he is reporting continued low back pain with radiation down left leg, constant, 3/10.  He reports associated numbness in his left leg into his foot.  He denies any weakness but continues to ambulate with a cane due to feeling unsteady due to the numbness.  Today he is also reporting neck pain 5/10, constant and worsened with movements.  He feels like he hears clicking sounds in his neck with movements.  He denies any significant numbness in his upper extremities, weakness or any new changes to his bowel bladder function.  He has trialed gabapentin and Lyrica in the past without significant relief.      Prior HPI: Chau Price is a 67 y.o. year old male patient who has a past medical history of Other ulcerative colitis with rectal bleeding. He presents in referral from No ref. provider found for back and leg pain.  The patient reports having left leg pain for the last 4 years, has undergone several surgeries and actually had undergone surgery at L4/5 about 1.5 years ago after which he reports his back pain is better but his left leg pain  And numbness still continues.  Feels that he actually has some slight weakness in his left foot.  He has been in physical therapy over the last year and continues to do exercises on his own.  Describes his pain as numb, sharp, shooting.  It starts at the hip and radiates to the ankle.  Is worse with lying down, he actually does better with standing and with some medications.  He denies any bowel or bladder  "incontinence.    Pain intervention history:  Had undergone some injections prior to his surgery.  He is status post left L4/5 and L5/S1 transforaminal RICHMOND on 11/21/2022 with no significant relief.     Spine surgeries:  Cervical surgery x2, lumbar surgery x3, the last was about 1.5 years ago.    Antineuropathics:  Has failed gabapentin and Lyrica in the past  NSAIDs:  Physical therapy:  Antidepressants:  Muscle relaxers:  Celexa 20 mg, BuSpar 10 mg, trazodone 50 mg  Opioids:  Antiplatelets/Anticoagulants:        ROS:  He reports joint stiffness, difficulty sleeping.  Balance of review of systems is negative.    No results found for: LABA1C, HGBA1C    Lab Results   Component Value Date    WBC 3.40 (L) 12/08/2022    HGB 10.9 (L) 12/08/2022    HCT 31.8 (L) 12/08/2022     (H) 12/08/2022     12/08/2022             Past Medical History:   Diagnosis Date    Other ulcerative colitis with rectal bleeding        Past Surgical History:   Procedure Laterality Date    BACK SURGERY  2008 2008-2017; cervical fusion x2 & lumbar fusion x3    REPAIR OF MENISCUS OF KNEE Left 2010    ROTATOR CUFF REPAIR Right 1997    TRANSFORAMINAL EPIDURAL INJECTION OF STEROID Left 10/21/2022    Procedure: Injection,steroid,epidural,transforaminal approach L4/5+L5/S1;  Surgeon: Diogo Travis MD;  Location: [facility];  Service: Pain Management;  Laterality: Left;       Social History     Socioeconomic History    Marital status:    Tobacco Use    Smoking status: Former     Types: Cigars    Smokeless tobacco: Never   Substance and Sexual Activity    Alcohol use: Yes     Comment: social         Medications/Allergies: See med card    Vitals:    12/15/22 0853   BP: (!) 203/103   Pulse: 60   SpO2: 98%   Weight: 98.1 kg (216 lb 2.6 oz)   Height: 5' 8" (1.727 m)   PainSc:   5   PainLoc: Leg     Body mass index is 32.87 kg/m².    Physical exam:    Gen: A and O x3, pleasant, well-groomed  Skin: No rashes or obvious lesions  HEENT: " PERRLA, no obvious deformities on ears or in canals. Trachea midline.  CVS: Regular rate and rhythm, normal palpable pulses.  Resp:No increased work of breathing, symmetrical chest rise.  Abdomen: Soft, NT/ND.  Musculoskeletal: No antalgic gait.  Ambulates with cane mainly for support from feeling unsteady due to numbness in left leg        Neuro:  Gen: A and O x3, pleasant, well-groomed  Skin: No rashes or obvious lesions  HEENT: PERRLA, no obvious deformities on ears or in canals.Trachea midline.  CVS: Regular rate and rhythm, normal palpable pulses.  Resp: Clear to auscultation bilaterally, no wheezes or rales.  Abdomen: Soft, NT/ND.  Musculoskeletal: Able to heel walk, toe walk. No antalgic gait.   Coordination   Romberg: negative  Finger to nose coordination: normal  Heel to shin coordination: normal  Tandem walking coordination: normal    Neuro:  Motor:    Right Left   C4 Shoulder Abduction  5  5   C5 Elbow Flexion    5  5   C6 Wrist Extension  5  5   C7 Elbow Extension   5  5   C8/T1 Hand Intrinsics   5  5   C8 First Dorsal Interosseus  5  5   C8 Abductor Pollicus Brevis  5  5       Sensory: Intact and symmetrical to light touch and pinprick in C2-T1 dermatomes bilaterally.  Cervical spine: ROM is mildly reduced with flexion, extension and lateral rotation without significant increased pain.  Spurling's maneuver causes no neck pain to either side.  Myofascial exam: No Tenderness to palpation across cervical paraspinous region bilaterally.        Iliopsoas Quadriceps Knee  Flexion Tibialis  anterior Gastro- cnemius EHL   Lower: R 5/5 5/5 5/5 5/5 5/5 5/5    L 5/5 5/5 5/5 4/5 5/5 4/5      Left  Right    Patellar DTR 1+ 1+   Achilles DTR 0+ 1+   Triceps DTR 1+ 1+   Biceps DTR 2+ 2+   Brachioradialis DTR 2+ 2+     Intact and symmetrical to light touch and pinprick in L1-S1 dermatomes bilaterally.    Lumbar spine:  Lumbar spine: ROM is   Moderately reduced with flexion extension and oblique extension with no  increased pain.    Olaf's test causes no increased pain on either side.    Supine straight leg raise is  positive for worsening left leg pain.  Internal and external rotation of the hip causes no increased pain on either side.  Myofascial exam: No tenderness to palpation across lumbar paraspinous muscles.    Imagin22 MRI L-spine:  There is a minimal retrolisthesis of L3 on L4.  There are no acute fractures.  There is a mild chronic compression of the superior endplate of L3.   There are postoperative changes from anterior interbody lumbar fusion L4-5 disc space.  There is presence of a spacer in a satisfactory position.  Decompressive laminectomies with resection of the spinous process of L4 and L5.   The tip of the conus medullaris is at L1 level.  Mild dextroscoliosis of the mid to upper lumbar spine.  Paraspinal soft tissues are unremarkable.   L5-S1: Disc degeneration with disc space narrowing.  There is a broad-based posterior osteophyte and disc complex greater on the right compared to the left with mild compression of thecal sac greater on the right.  No significant central canal spinal stenosis.  There is facet arthropathy decompressive laminectomy noted.  No abnormal intradural enhancement.  The in the far lateral right neural foramen there is an osteophyte and a disc bulge complex that contacts the inferior surface of the exiting right L5 root (image 10 series 3).  Clinical correlation for right L5 radiculopathy suggested.  Left neural foramen is unremarkable.   L4-5: Postoperative changes from anterior interbody lumbar fusion.  No recurrent disc herniations or central canal spinal stenosis.  There is enhancing scar the in the right lateral recess.  Stable postoperative changes from decompressive laminectomy.  Degenerative facet arthropathy bilaterally.   L3-4: Approximately 2 mm retrolisthesis of L3 on L4.  Disc space narrowing associated with disc degeneration.  There is a broad-based central  and left paracentral disc protrusion/herniation resulting in left lateral recess stenosis (image 28 series 12.  There is also at least a moderate left L3-4 foraminal stenosis.  Compression of the exiting left L3 root in the neural foramen is not excluded.  Right neural foramen is unremarkable.  There is mild facet arthropathy.   L2-3: Disc degeneration with circumferential annular disc bulge.  No significant disc herniations or significant central canal spinal stenosis.  There is facet arthropathy.   L1-2: There is a mild left paracentral disc bulge/protrusion.  No significant central canal spinal stenosis.  There is facet arthropathy.  Neural foramina are unremarkable.   T12-L1: Disc degeneration with disc space narrowing.  In addition to the circumferential mild annular disc bulge there is a left paracentral disc protrusion/disc herniation resulting in at least a mild left lateral recess stenosis (image 4 series 12).  Mild bilateral foraminal stenosis.     5/17/22 Xray L-spine:  There are no prior studies for comparison at this time.  There are postoperative changes from anterior interbody lumbar fusion.  Hardware in satisfactory position.  No subluxations is noted.   On flexion and extension radiographs there is no significant instability at all intervertebral disc spaces.   here is mild depression of the superior endplate of L3, likely from a chronic mild compression fracture.  There is degenerative disc disease also at the L2-3 disc space and L3-4 and L5-S1 disc spaces.  Mild marginal anterior spondylotic osteophytes throughout the lumbar spine.   There is calcific atherosclerotic changes of the abdominal aorta.  There is also disc degeneration at the T11-T12 disc space associated with circumferential mild annular disc bulge    Assessment:  Chau Price is a 66 y.o. year old male patient who has a past medical history of Other ulcerative colitis with rectal bleeding. He presents in referral from   René Christine for back and leg pain.    1. Cervicalgia  X-Ray Cervical Spine 5 View With Flex And Ext    amitriptyline (ELAVIL) 10 MG tablet      2. Lumbar radiculopathy  amitriptyline (ELAVIL) 10 MG tablet      3. History of lumbar spinal fusion        4. History of fusion of cervical spine              Plan:    1. He did not find significant relief with the most recent transforaminal epidural steroid injection.  Discussed potential spinal cord stimulator with him today.  At this time he would like to hold off and consider before proceeding.  He is interested in a trial of medication for his continued neuropathic pain.  We will start him on a trial of amitriptyline 10 mg nightly for the neuropathic component of his pain in addition to him reporting not sleeping well at night.  2. He has history of cervical fusion.  Updated x-ray of cervical spine ordered today to rule out any instability or any significant changes to his fusion hardware.  3. We will have him follow-up in 4 weeks to see how he is tolerating the trial of amitriptyline.  Pamphlet for [employer] spinal cord stimulator provided today.      Thank you for referring this interesting patient, and I look forward to continuing to collaborate in his care.

## 2023-01-20 ENCOUNTER — TELEPHONE (OUTPATIENT)
Dept: OPHTHALMOLOGY | Facility: CLINIC | Age: 68
End: 2023-01-20
Payer: MEDICARE

## 2023-01-20 ENCOUNTER — TELEPHONE (OUTPATIENT)
Dept: UROLOGY | Facility: CLINIC | Age: 68
End: 2023-01-20
Payer: MEDICARE

## 2023-01-20 NOTE — TELEPHONE ENCOUNTER
----- Message from Yue Cantor sent at 1/20/2023 12:14 PM CST -----  Type:  Sooner Apoointment Request    Caller is requesting a sooner appointment.  Caller declined first available appointment listed below.  Caller will not accept being placed on the waitlist and is requesting a message be sent to doctor.  Name of Caller:Chau Price     When is the first available appointment?3/7/23     Symptoms:BPH     Would the patient rather a call back or a response via [facility]?  Call back     Best Call Back Number: [phone] (mobile)     Additional Information: any day but a Tuesday

## 2023-01-20 NOTE — TELEPHONE ENCOUNTER
----- Message from Lena Horne sent at 1/20/2023 11:45 AM CST -----  Regarding: APPT CHANGE REQUEST  Contact: Patient  Type: Patient Call Back         Who called: Patient         What is the request in detail: calling to resched appt sched Mon 1/23; prefers any day but Tuesday; please advise         Can the clinic reply by [facility]? yes         Would the patient rather a call back or a response via [facility]? [email or web address]         Best call back number: [phone]         Additional Information:            Thank You

## 2023-01-24 ENCOUNTER — OFFICE VISIT (OUTPATIENT)
Dept: UROLOGY | Facility: CLINIC | Age: 68
End: 2023-01-24
Payer: MEDICARE

## 2023-01-24 VITALS — HEIGHT: 68 IN | WEIGHT: 216.5 LBS | BODY MASS INDEX: 32.81 KG/M2

## 2023-01-24 DIAGNOSIS — N40.1 NOCTURIA ASSOCIATED WITH BENIGN PROSTATIC HYPERPLASIA: ICD-10-CM

## 2023-01-24 DIAGNOSIS — R35.1 NOCTURIA ASSOCIATED WITH BENIGN PROSTATIC HYPERPLASIA: ICD-10-CM

## 2023-01-24 LAB
BILIRUBIN, UA POC OHS: NEGATIVE
BLOOD, UA POC OHS: NEGATIVE
CLARITY, UA POC OHS: CLEAR
COLOR, UA POC OHS: YELLOW
GLUCOSE, UA POC OHS: NEGATIVE
KETONES, UA POC OHS: NEGATIVE
LEUKOCYTES, UA POC OHS: NEGATIVE
NITRITE, UA POC OHS: NEGATIVE
PH, UA POC OHS: 5.5
POC RESIDUAL URINE VOLUME: 29 ML (ref 0–100)
PROTEIN, UA POC OHS: NEGATIVE
SPECIFIC GRAVITY, UA POC OHS: <=1.005
UROBILINOGEN, UA POC OHS: 0.2

## 2023-01-24 PROCEDURE — 51798 US URINE CAPACITY MEASURE: CPT | Mod: PBBFAC,PO

## 2023-01-24 PROCEDURE — 99204 PR OFFICE/OUTPT VISIT, NEW, LEVL IV, 45-59 MIN: ICD-10-PCS | Mod: S$PBB,,,

## 2023-01-24 PROCEDURE — 99213 OFFICE O/P EST LOW 20 MIN: CPT | Mod: PBBFAC,PO

## 2023-01-24 PROCEDURE — 99999 PR PBB SHADOW E&M-EST. PATIENT-LVL III: CPT | Mod: PBBFAC,,,

## 2023-01-24 PROCEDURE — 99999 PR PBB SHADOW E&M-EST. PATIENT-LVL III: ICD-10-PCS | Mod: PBBFAC,,,

## 2023-01-24 PROCEDURE — 81003 URINALYSIS AUTO W/O SCOPE: CPT | Mod: PBBFAC,PO

## 2023-01-24 PROCEDURE — 99204 OFFICE O/P NEW MOD 45 MIN: CPT | Mod: S$PBB,,,

## 2023-01-24 RX ORDER — FINASTERIDE 5 MG/1
5 TABLET, FILM COATED ORAL DAILY
Qty: 90 TABLET | Refills: 3 | Status: SHIPPED | OUTPATIENT
Start: 2023-01-24 | End: 2023-04-20

## 2023-01-24 NOTE — PROGRESS NOTES
[facility] Note  Staff: INDIA Ponce    PCP: MD Anton    Chief Complaint: BPH with LUTS    Subjective:        HPI: Chau Price is a 67 y.o. male NEW PATIENT presents today for evaluation of BPH with LUTS. He has a history of a Urolift done in [place] 2-3 years ago. He states he had great response but is now experiencing a weak and slower urinary stream, nocturia, and an intermittent stream. He states prior to the urolift he was taking tamsulosin, finasteride, and cialis for BPH. He is currently not taking any bladder or prostate medications. He denies dysuria, hematuria, urgency, frequency, incontinence, and difficulty urinating. He denies a history of kidney stones. His most recent PSA from 22 was 0.70. He denies a family history of prostate cancer. He does have a family history of bladder cancer.     Questions asked the pt during ov today:  Urgency: No, urge incontinence? No  NTF: 3x night  Dysuria: No  Gross Hematuria:No  Straining:No, Hesistancy:No, Intermittency:Yes, Weak stream:Yes    Last PSA Screening:   Lab Results   Component Value Date    PSA 0.70 2022       History of Kidney Stones?:  No    Constipation issues?:  No    AUA SS Today:  23  Feeling of ICBE:  5  Frequency:  3  Intermittency:  4  Urgency:  3  Weak urine stream:  4  Strainin  Nocturia:  3    PVR by bladder scan performed by LPN today:  38 mL    REVIEW OF SYSTEMS:  Review of Systems   Constitutional: Negative.  Negative for chills and fever.   HENT: Negative.     Eyes: Negative.    Respiratory: Negative.     Cardiovascular: Negative.    Gastrointestinal: Negative.  Negative for abdominal pain, nausea and vomiting.   Genitourinary: Negative.  Negative for dysuria, flank pain, frequency, hematuria and urgency.        Nocturia  Weak urinary stream   Musculoskeletal: Negative.  Negative for back pain.   Skin: Negative.    Neurological: Negative.    Endo/Heme/Allergies: Negative.     Psychiatric/Behavioral: Negative.       PMHx:  Past Medical History:   Diagnosis Date    Other ulcerative colitis with rectal bleeding        PSHx:  Past Surgical History:   Procedure Laterality Date    BACK SURGERY  2008 2008-2017; cervical fusion x2 & lumbar fusion x3    INCONTINENCE SURGERY      Urolift    REPAIR OF MENISCUS OF KNEE Left 2010    ROTATOR CUFF REPAIR Right 1997    TRANSFORAMINAL EPIDURAL INJECTION OF STEROID Left 10/21/2022    Procedure: Injection,steroid,epidural,transforaminal approach L4/5+L5/S1;  Surgeon: Diogo Travis MD;  [facility];  Service: Pain Management;  Laterality: Left;       Fam Hx:   malignancies: Yes - father bladder cancer    kidney stones: No     Soc Hx:  Lives in [place]    Allergies:  Azathioprine and Mercaptopurine    Medications: reviewed     Objective:   There were no vitals filed for this visit.    Physical Exam  Constitutional:       Appearance: Normal appearance.   HENT:      Head: Normocephalic.      Mouth/Throat:      Mouth: Mucous membranes are moist.   Eyes:      Conjunctiva/sclera: Conjunctivae normal.   Pulmonary:      Effort: Pulmonary effort is normal.   Abdominal:      General: There is no distension.      Palpations: Abdomen is soft.      Tenderness: There is no abdominal tenderness. There is no right CVA tenderness or left CVA tenderness.   Musculoskeletal:         General: Normal range of motion.      Cervical back: Normal range of motion.   Skin:     General: Skin is warm.   Neurological:      Mental Status: He is alert and oriented to person, place, and time.   Psychiatric:         Mood and Affect: Mood normal.         Behavior: Behavior normal.        EXAM performed by me in office today:  Inspection of anus and perineum normal  TERESA: 35g prostate gland without nodules, masses, tenderness. SV not palpable. Normal sphincter tone.   No hemorrhoids visible    LABS REVIEW:  UA today:  Color:Clear, Yellow  Spec. Grav.  <1.005  PH   5.5  Negative for leukocytes, nitrates, protein, glucose, ketones, urobili, bili, and blood.    Assessment:       1. Nocturia associated with benign prostatic hyperplasia            Plan:     Finasteride 5mg prescribed to pt today as trial to see if med improves pt's current LUTS.  Benefits, risks and side affects were thoroughly explained to pt today in office with all questions answered.  In office cystoscopy ordered and scheduled    F/u As Needed per Treatment Plan    [facility]: Active    JOSE PonceC

## 2023-02-08 ENCOUNTER — OFFICE VISIT (OUTPATIENT)
Dept: PAIN MEDICINE | Facility: CLINIC | Age: 68
End: 2023-02-08
Payer: MEDICARE

## 2023-02-08 VITALS
WEIGHT: 214.06 LBS | HEART RATE: 62 BPM | DIASTOLIC BLOOD PRESSURE: 86 MMHG | BODY MASS INDEX: 32.44 KG/M2 | HEIGHT: 68 IN | SYSTOLIC BLOOD PRESSURE: 161 MMHG

## 2023-02-08 DIAGNOSIS — M47.816 LUMBAR SPONDYLOSIS: ICD-10-CM

## 2023-02-08 DIAGNOSIS — M54.16 LUMBAR RADICULOPATHY: Primary | ICD-10-CM

## 2023-02-08 DIAGNOSIS — Z98.1 HISTORY OF LUMBAR SPINAL FUSION: ICD-10-CM

## 2023-02-08 DIAGNOSIS — M54.2 CERVICALGIA: ICD-10-CM

## 2023-02-08 PROCEDURE — 99999 PR PBB SHADOW E&M-EST. PATIENT-LVL IV: ICD-10-PCS | Mod: PBBFAC,,,

## 2023-02-08 PROCEDURE — 99214 OFFICE O/P EST MOD 30 MIN: CPT | Mod: S$PBB,,,

## 2023-02-08 PROCEDURE — 99999 PR PBB SHADOW E&M-EST. PATIENT-LVL IV: CPT | Mod: PBBFAC,,,

## 2023-02-08 PROCEDURE — 99214 PR OFFICE/OUTPT VISIT, EST, LEVL IV, 30-39 MIN: ICD-10-PCS | Mod: S$PBB,,,

## 2023-02-08 PROCEDURE — 99214 OFFICE O/P EST MOD 30 MIN: CPT | Mod: PBBFAC,PN

## 2023-02-08 RX ORDER — HYDROXYZINE HYDROCHLORIDE 25 MG/1
25 TABLET, FILM COATED ORAL
COMMUNITY
Start: 2023-01-27 | End: 2023-02-28

## 2023-02-08 RX ORDER — PREGABALIN 75 MG/1
75 CAPSULE ORAL 2 TIMES DAILY
Qty: 60 CAPSULE | Refills: 1 | Status: SHIPPED | OUTPATIENT
Start: 2023-02-08 | End: 2023-03-29 | Stop reason: SDUPTHER

## 2023-02-08 RX ORDER — HYDROXYZINE PAMOATE 25 MG/1
25 CAPSULE ORAL
COMMUNITY
Start: 2023-01-27 | End: 2023-02-28

## 2023-02-08 RX ORDER — AMITRIPTYLINE HYDROCHLORIDE 10 MG/1
10 TABLET, FILM COATED ORAL NIGHTLY PRN
Qty: 30 TABLET | Refills: 3 | Status: SHIPPED | OUTPATIENT
Start: 2023-02-08 | End: 2023-02-28

## 2023-02-08 NOTE — PROGRESS NOTES
This note was completed with dictation software and grammatical errors may exist.    No chief complaint on file.       HPI: Chau Price is a 67 y.o. year old male patient who has a past medical history of Other ulcerative colitis with rectal bleeding. He presents in referral from No ref. provider found for back and leg pain.  Today he is reporting continued low back pain, 4-6/10, better throughout the day and worse at night when trying to sleep.  He states the pain starts in his lower back and radiates into his bilateral hips and down both legs, left greater than right.  He describes the pain is sharp shooting, burning.  He reports chronic numbness in his left foot but otherwise denies any new numbness, weakness or any new changes to his bowel bladder function.  He continues to take amitriptyline 10 mg nightly with unknown relief. The burning pain and pain radiating down his legs is is most bothersome to him especially at night and prevents him from sleeping.        Prior HPI: Chau Price is a 67 y.o. year old male patient who has a past medical history of Other ulcerative colitis with rectal bleeding. He presents in referral from No ref. provider found for back and leg pain.  The patient reports having left leg pain for the last 4 years, has undergone several surgeries and actually had undergone surgery at L4/5 about 1.5 years ago after which he reports his back pain is better but his left leg pain  And numbness still continues.  Feels that he actually has some slight weakness in his left foot.  He has been in physical therapy over the last year and continues to do exercises on his own.  Describes his pain as numb, sharp, shooting.  It starts at the hip and radiates to the ankle.  Is worse with lying down, he actually does better with standing and with some medications.  He denies any bowel or bladder incontinence.    Pain intervention history:  Had undergone some injections prior to his surgery.   "He is status post left L4/5 and L5/S1 transforaminal RICHMOND on 11/21/2022 with no significant relief.     Spine surgeries:  Cervical surgery x2, lumbar surgery x3, the last was about 1.5 years ago.    Antineuropathics:  Has failed gabapentin and Lyrica in the past  NSAIDs:  Physical therapy:  Antidepressants:  Muscle relaxers:  Celexa 20 mg, BuSpar 10 mg, trazodone 50 mg  Opioids:  Antiplatelets/Anticoagulants:        ROS:  He reports joint stiffness, difficulty sleeping.  Balance of review of systems is negative.    No results found for: LABA1C, HGBA1C    Lab Results   Component Value Date    WBC 3.40 (L) 12/08/2022    HGB 10.9 (L) 12/08/2022    HCT 31.8 (L) 12/08/2022     (H) 12/08/2022     12/08/2022             Past Medical History:   Diagnosis Date    Other ulcerative colitis with rectal bleeding        Past Surgical History:   Procedure Laterality Date    BACK SURGERY  2008 2008-2017; cervical fusion x2 & lumbar fusion x3    INCONTINENCE SURGERY      Urolift    REPAIR OF MENISCUS OF KNEE Left 2010    ROTATOR CUFF REPAIR Right 1997    TRANSFORAMINAL EPIDURAL INJECTION OF STEROID Left 10/21/2022    Procedure: Injection,steroid,epidural,transforaminal approach L4/5+L5/S1;  Surgeon: Diogo Travis MD;  Location: [facility] OR;  Service: Pain Management;  Laterality: Left;       Social History     Socioeconomic History    Marital status:    Tobacco Use    Smoking status: Former     Types: Cigars    Smokeless tobacco: Never   Substance and Sexual Activity    Alcohol use: Yes     Comment: social         Medications/Allergies: See med card    Vitals:    02/08/23 1357   BP: (!) 161/86   Pulse: 62   Weight: 97.1 kg (214 lb 1.1 oz)   Height: 5' 8" (1.727 m)   PainSc:   4   PainLoc: Hip     Body mass index is 32.55 kg/m².    Physical exam:    Gen: A and O x3, pleasant, well-groomed  Skin: No rashes or obvious lesions  HEENT: PERRLA, no obvious deformities on ears or in canals. Trachea midline.  CVS: " Regular rate and rhythm, normal palpable pulses.  Resp:No increased work of breathing, symmetrical chest rise.  Abdomen: Soft, NT/ND.  Musculoskeletal: No antalgic gait.  Ambulates with cane mainly for support from feeling unsteady due to numbness in left leg        Neuro:  Gen: A and O x3, pleasant, well-groomed  Skin: No rashes or obvious lesions  HEENT: PERRLA, no obvious deformities on ears or in canals.Trachea midline.  CVS: Regular rate and rhythm, normal palpable pulses.  Resp: Clear to auscultation bilaterally, no wheezes or rales.  Abdomen: Soft, NT/ND.  Musculoskeletal: Able to heel walk, toe walk. No antalgic gait.   Coordination   Romberg: negative  Finger to nose coordination: normal  Heel to shin coordination: normal  Tandem walking coordination: normal    Neuro:  Motor:        Iliopsoas Quadriceps Knee  Flexion Tibialis  anterior Gastro- cnemius EHL   Lower: R 5/5 5/5 5/5 5/5 5/5 5/5    L 5/5 5/5 5/5 5/5 5/5 4/5      Left  Right    Patellar DTR 1+ 1+   Achilles DTR 0+ 1+   Triceps DTR 1+ 1+   Biceps DTR 2+ 2+   Brachioradialis DTR 2+ 2+     Intact and symmetrical to light touch and pinprick in L1-S1 dermatomes bilaterally.    Lumbar spine:  Lumbar spine: ROM is   Moderately reduced with flexion extension and oblique extension with no increased pain.    Olaf's test causes no increased pain on either side.    Supine straight leg raise is  positive for worsening left leg pain.  Internal and external rotation of the hip causes no increased pain on either side.  Myofascial exam: No tenderness to palpation across lumbar paraspinous muscles.    Imagin22 MRI L-spine:  There is a minimal retrolisthesis of L3 on L4.  There are no acute fractures.  There is a mild chronic compression of the superior endplate of L3.   There are postoperative changes from anterior interbody lumbar fusion L4-5 disc space.  There is presence of a spacer in a satisfactory position.  Decompressive laminectomies with  resection of the spinous process of L4 and L5.   The tip of the conus medullaris is at L1 level.  Mild dextroscoliosis of the mid to upper lumbar spine.  Paraspinal soft tissues are unremarkable.   L5-S1: Disc degeneration with disc space narrowing.  There is a broad-based posterior osteophyte and disc complex greater on the right compared to the left with mild compression of thecal sac greater on the right.  No significant central canal spinal stenosis.  There is facet arthropathy decompressive laminectomy noted.  No abnormal intradural enhancement.  The in the far lateral right neural foramen there is an osteophyte and a disc bulge complex that contacts the inferior surface of the exiting right L5 root (image 10 series 3).  Clinical correlation for right L5 radiculopathy suggested.  Left neural foramen is unremarkable.   L4-5: Postoperative changes from anterior interbody lumbar fusion.  No recurrent disc herniations or central canal spinal stenosis.  There is enhancing scar the in the right lateral recess.  Stable postoperative changes from decompressive laminectomy.  Degenerative facet arthropathy bilaterally.   L3-4: Approximately 2 mm retrolisthesis of L3 on L4.  Disc space narrowing associated with disc degeneration.  There is a broad-based central and left paracentral disc protrusion/herniation resulting in left lateral recess stenosis (image 28 series 12.  There is also at least a moderate left L3-4 foraminal stenosis.  Compression of the exiting left L3 root in the neural foramen is not excluded.  Right neural foramen is unremarkable.  There is mild facet arthropathy.   L2-3: Disc degeneration with circumferential annular disc bulge.  No significant disc herniations or significant central canal spinal stenosis.  There is facet arthropathy.   L1-2: There is a mild left paracentral disc bulge/protrusion.  No significant central canal spinal stenosis.  There is facet arthropathy.  Neural foramina are  unremarkable.   T12-L1: Disc degeneration with disc space narrowing.  In addition to the circumferential mild annular disc bulge there is a left paracentral disc protrusion/disc herniation resulting in at least a mild left lateral recess stenosis (image 4 series 12).  Mild bilateral foraminal stenosis.     5/17/22 Xray L-spine:  There are no prior studies for comparison at this time.  There are postoperative changes from anterior interbody lumbar fusion.  Hardware in satisfactory position.  No subluxations is noted.   On flexion and extension radiographs there is no significant instability at all intervertebral disc spaces.   here is mild depression of the superior endplate of L3, likely from a chronic mild compression fracture.  There is degenerative disc disease also at the L2-3 disc space and L3-4 and L5-S1 disc spaces.  Mild marginal anterior spondylotic osteophytes throughout the lumbar spine.   There is calcific atherosclerotic changes of the abdominal aorta.  There is also disc degeneration at the T11-T12 disc space associated with circumferential mild annular disc bulge    Assessment:  Chau Price is a 66 y.o. year old male patient who has a past medical history of Other ulcerative colitis with rectal bleeding. He presents in referral from Dr. René Christine for back and leg pain.    1. Lumbar radiculopathy  Ambulatory referral/consult to Physical/Occupational Therapy    amitriptyline (ELAVIL) 10 MG tablet      2. Cervicalgia  amitriptyline (ELAVIL) 10 MG tablet      3. History of lumbar spinal fusion        4. Lumbar spondylosis                Plan:  1. Today we discussed his pain and symptoms.  He continues to take amitriptyline with unknown relief.    2. At this time I think it is best to continue to maximize conservative therapy with rest, medications and formal physical therapy.  3. He has trialed Lyrica in the past without significant relief however denied any adverse side effects.  I would  like to restart him on a trial of this.  He is unsure of the dosage but I would like to start him on a trial of Lyrica 75 mg nightly for the 1st week and then b.i.d. thereafter.  Discussed if he has any ill side effects or adverse events to discontinue immediately.  Prescription sent to Dr. Travis today for approval. I have reviewed the [place] Board [employer] website and there are no abberancies.    4. I also placed orders for more formal physical therapy.  He reports he had excellent relief in the past with physical therapy.  5. We again discussed potential spinal cord stimulator and he is interested however not interested at this time.  He would like to see how he progresses with PT and trial medications.  Previously pamphlet for [employer] was provided.  6. Follow up in 6 weeks to see how he is progressing.  He may potentially benefit from a caudal RICHMOND however he found no significant relief with previous RICHMOND.        Thank you for referring this interesting patient, and I look forward to continuing to collaborate in his care.

## 2023-02-17 ENCOUNTER — TELEPHONE (OUTPATIENT)
Dept: PAIN MEDICINE | Facility: CLINIC | Age: 68
End: 2023-02-17
Payer: MEDICARE

## 2023-02-17 NOTE — TELEPHONE ENCOUNTER
Pt states that he is still in pain at night despite medication changed from [facility] he states especially at night wanted to ask if he can have Hydrocodone for night time only once a day until he returns to the office for next visit.  Please advise. Thank you.

## 2023-02-17 NOTE — TELEPHONE ENCOUNTER
----- Message from Dillon Zuniga sent at 2/17/2023  3:11 PM CST -----  Regarding: Patient advice  Contact: Pt  Pt called in regards to speaking with nurse in office       Please advise ,Pt can be reached at [phone]

## 2023-03-29 ENCOUNTER — OFFICE VISIT (OUTPATIENT)
Dept: PAIN MEDICINE | Facility: CLINIC | Age: 68
End: 2023-03-29
Payer: MEDICARE

## 2023-03-29 VITALS
WEIGHT: 228.19 LBS | DIASTOLIC BLOOD PRESSURE: 80 MMHG | HEART RATE: 52 BPM | SYSTOLIC BLOOD PRESSURE: 142 MMHG | BODY MASS INDEX: 34.59 KG/M2 | HEIGHT: 68 IN

## 2023-03-29 DIAGNOSIS — M54.16 LUMBAR RADICULOPATHY: Primary | ICD-10-CM

## 2023-03-29 DIAGNOSIS — M54.16 LUMBAR RADICULOPATHY: ICD-10-CM

## 2023-03-29 DIAGNOSIS — Z98.1 HISTORY OF FUSION OF CERVICAL SPINE: ICD-10-CM

## 2023-03-29 DIAGNOSIS — M47.816 LUMBAR SPONDYLOSIS: ICD-10-CM

## 2023-03-29 DIAGNOSIS — Z98.1 HISTORY OF LUMBAR SPINAL FUSION: ICD-10-CM

## 2023-03-29 PROCEDURE — 99213 OFFICE O/P EST LOW 20 MIN: CPT | Mod: S$PBB,,,

## 2023-03-29 PROCEDURE — 99999 PR PBB SHADOW E&M-EST. PATIENT-LVL III: ICD-10-PCS | Mod: PBBFAC,,,

## 2023-03-29 PROCEDURE — 99999 PR PBB SHADOW E&M-EST. PATIENT-LVL III: CPT | Mod: PBBFAC,,,

## 2023-03-29 PROCEDURE — 99213 PR OFFICE/OUTPT VISIT, EST, LEVL III, 20-29 MIN: ICD-10-PCS | Mod: S$PBB,,,

## 2023-03-29 PROCEDURE — 99213 OFFICE O/P EST LOW 20 MIN: CPT | Mod: PBBFAC,PN

## 2023-03-29 RX ORDER — PREGABALIN 75 MG/1
75 CAPSULE ORAL 2 TIMES DAILY
Qty: 60 CAPSULE | Refills: 3 | Status: SHIPPED | OUTPATIENT
Start: 2023-03-29 | End: 2023-06-19

## 2023-03-29 RX ORDER — AMITRIPTYLINE HYDROCHLORIDE 25 MG/1
25 TABLET, FILM COATED ORAL NIGHTLY PRN
Qty: 30 TABLET | Refills: 2 | Status: SHIPPED | OUTPATIENT
Start: 2023-03-29 | End: 2023-06-12

## 2023-03-29 RX ORDER — HYDROCODONE BITARTRATE AND ACETAMINOPHEN 5; 325 MG/1; MG/1
1 TABLET ORAL EVERY 8 HOURS PRN
Qty: 21 TABLET | Refills: 0 | Status: SHIPPED | OUTPATIENT
Start: 2023-03-29 | End: 2023-04-05

## 2023-03-29 RX ORDER — METHOCARBAMOL 500 MG/1
500 TABLET, FILM COATED ORAL 4 TIMES DAILY
Qty: 40 TABLET | Refills: 2 | Status: SHIPPED | OUTPATIENT
Start: 2023-03-29 | End: 2023-04-08

## 2023-03-29 NOTE — TELEPHONE ENCOUNTER
I have reviewed the [place] Board [employer] Pharmacy website and there are no abberancies.       He is requesting a short course of pain medication for severe pain.  I think this is reasonable.  Discussed this would not be long term.

## 2023-03-29 NOTE — PROGRESS NOTES
This note was completed with dictation software and grammatical errors may exist.    Chief Complaint   Patient presents with    Follow-up        HPI: Chau Price is a 67 y.o. year old male patient who has a past medical history of Other ulcerative colitis with rectal bleeding. He presents in referral from No ref. provider found for back and leg pain.  He returns for follow-up after trial of Lyrica 75 mg b.i.d..  He is tolerating it well and feels like it is providing him with some benefit however not complete.  He continues to have low back pain with radiation into bilateral legs, 5-8/10, generally tolerable throughout the day however significantly worsened at night when trying to sleep.  This significantly impacts his ability to sleep.  He reports numbness in his left foot but denies any other new or worsening numbness, jj weakness or any new changes to his bowel or bladder function.  He continues to take amitriptyline 10 mg nightly with only mild relief.  He was unable to start formal PT due to scheduling conflicts.      Prior HPI: Chau Price is a 67 y.o. year old male patient who has a past medical history of Other ulcerative colitis with rectal bleeding. He presents in referral from No ref. provider found for back and leg pain.  The patient reports having left leg pain for the last 4 years, has undergone several surgeries and actually had undergone surgery at L4/5 about 1.5 years ago after which he reports his back pain is better but his left leg pain  And numbness still continues.  Feels that he actually has some slight weakness in his left foot.  He has been in physical therapy over the last year and continues to do exercises on his own.  Describes his pain as numb, sharp, shooting.  It starts at the hip and radiates to the ankle.  Is worse with lying down, he actually does better with standing and with some medications.  He denies any bowel or bladder incontinence.    Pain intervention  "history:  Had undergone some injections prior to his surgery.  He is status post left L4/5 and L5/S1 transforaminal RICHMOND on 11/21/2022 with no significant relief.     Spine surgeries:  Cervical surgery x2, lumbar surgery x3, the last was about 1.5 years ago.    Antineuropathics:  Has failed gabapentin and Lyrica in the past  NSAIDs:  Physical therapy:  Antidepressants:  Muscle relaxers:  Celexa 20 mg, BuSpar 10 mg, trazodone 50 mg  Opioids:  Antiplatelets/Anticoagulants:        ROS:  He reports joint stiffness, difficulty sleeping.  Balance of review of systems is negative.    No results found for: LABA1C, HGBA1C    Lab Results   Component Value Date    WBC 3.40 (L) 12/08/2022    HGB 10.9 (L) 12/08/2022    HCT 31.8 (L) 12/08/2022     (H) 12/08/2022     12/08/2022             Past Medical History:   Diagnosis Date    Other ulcerative colitis with rectal bleeding        Past Surgical History:   Procedure Laterality Date    BACK SURGERY  2008 2008-2017; cervical fusion x2 & lumbar fusion x3    INCONTINENCE SURGERY      Urolift    REPAIR OF MENISCUS OF KNEE Left 2010    ROTATOR CUFF REPAIR Right 1997    TRANSFORAMINAL EPIDURAL INJECTION OF STEROID Left 10/21/2022    Procedure: Injection,steroid,epidural,transforaminal approach L4/5+L5/S1;  Surgeon: Diogo Travis MD;  [place] [facility] [place]  Service: Pain Management;  Laterality: Left;       Social History     Socioeconomic History    Marital status:    Tobacco Use    Smoking status: Former     Types: Cigars    Smokeless tobacco: Never   Substance and Sexual Activity    Alcohol use: Yes     Comment: social         Medications/Allergies: See med card    Vitals:    03/29/23 1359   BP: (!) 142/80   Pulse: (!) 52   Weight: 103.5 kg (228 lb 2.8 oz)   Height: 5' 8" (1.727 m)   PainSc:   5   PainLoc: Back     Body mass index is 34.69 kg/m².    Physical exam:    Gen: A and O x3, pleasant, well-groomed  Skin: No rashes or obvious lesions  HEENT: [place], " no obvious deformities on ears or in canals. Trachea midline.  CVS: Regular rate and rhythm, normal palpable pulses.  Resp:No increased work of breathing, symmetrical chest rise.  Abdomen: Soft, NT/ND.  Musculoskeletal: No antalgic gait.  Ambulates with cane mainly for support from feeling unsteady due to numbness in left leg    Neuro:  Gen: A and O x3, pleasant, well-groomed  Skin: No rashes or obvious lesions  HEENT: PERRLA, no obvious deformities on ears or in canals.Trachea midline.  CVS: Regular rate and rhythm, normal palpable pulses.  Resp: Clear to auscultation bilaterally, no wheezes or rales.  Abdomen: Soft, NT/ND.  Musculoskeletal: Able to heel walk, toe walk. No antalgic gait.       Neuro:  Motor:        Iliopsoas Quadriceps Knee  Flexion Tibialis  anterior Gastro- cnemius EHL   Lower: R 5/5 5/5 5/5 5/5 5/5 5/5    L 5/5 5/5 5/5 5/5 5/5 4/5      Left  Right    Patellar DTR 1+ 1+   Achilles DTR 0+ 1+   Triceps DTR 1+ 1+   Biceps DTR 2+ 2+   Brachioradialis DTR 2+ 2+     Intact and symmetrical to light touch and pinprick in L1-S1 dermatomes bilaterally.    Lumbar spine:  Lumbar spine: ROM is   Moderately reduced with flexion extension and oblique extension with no increased pain.    Olaf's test causes no increased pain on either side.    Supine straight leg raise is  positive for worsening left leg pain.  Internal and external rotation of the hip causes no increased pain on either side.  Myofascial exam: No tenderness to palpation across lumbar paraspinous muscles.    Imagin22 MRI L-spine:  There is a minimal retrolisthesis of L3 on L4.  There are no acute fractures.  There is a mild chronic compression of the superior endplate of L3.   There are postoperative changes from anterior interbody lumbar fusion L4-5 disc space.  There is presence of a spacer in a satisfactory position.  Decompressive laminectomies with resection of the spinous process of L4 and L5.   The tip of the conus medullaris is  at L1 level.  Mild dextroscoliosis of the mid to upper lumbar spine.  Paraspinal soft tissues are unremarkable.   L5-S1: Disc degeneration with disc space narrowing.  There is a broad-based posterior osteophyte and disc complex greater on the right compared to the left with mild compression of thecal sac greater on the right.  No significant central canal spinal stenosis.  There is facet arthropathy decompressive laminectomy noted.  No abnormal intradural enhancement.  The in the far lateral right neural foramen there is an osteophyte and a disc bulge complex that contacts the inferior surface of the exiting right L5 root (image 10 series 3).  Clinical correlation for right L5 radiculopathy suggested.  Left neural foramen is unremarkable.   L4-5: Postoperative changes from anterior interbody lumbar fusion.  No recurrent disc herniations or central canal spinal stenosis.  There is enhancing scar the in the right lateral recess.  Stable postoperative changes from decompressive laminectomy.  Degenerative facet arthropathy bilaterally.   L3-4: Approximately 2 mm retrolisthesis of L3 on L4.  Disc space narrowing associated with disc degeneration.  There is a broad-based central and left paracentral disc protrusion/herniation resulting in left lateral recess stenosis (image 28 series 12.  There is also at least a moderate left L3-4 foraminal stenosis.  Compression of the exiting left L3 root in the neural foramen is not excluded.  Right neural foramen is unremarkable.  There is mild facet arthropathy.   L2-3: Disc degeneration with circumferential annular disc bulge.  No significant disc herniations or significant central canal spinal stenosis.  There is facet arthropathy.   L1-2: There is a mild left paracentral disc bulge/protrusion.  No significant central canal spinal stenosis.  There is facet arthropathy.  Neural foramina are unremarkable.   T12-L1: Disc degeneration with disc space narrowing.  In addition to the  circumferential mild annular disc bulge there is a left paracentral disc protrusion/disc herniation resulting in at least a mild left lateral recess stenosis (image 4 series 12).  Mild bilateral foraminal stenosis.     5/17/22 Xray L-spine:  There are no prior studies for comparison at this time.  There are postoperative changes from anterior interbody lumbar fusion.  Hardware in satisfactory position.  No subluxations is noted.   On flexion and extension radiographs there is no significant instability at all intervertebral disc spaces.   here is mild depression of the superior endplate of L3, likely from a chronic mild compression fracture.  There is degenerative disc disease also at the L2-3 disc space and L3-4 and L5-S1 disc spaces.  Mild marginal anterior spondylotic osteophytes throughout the lumbar spine.   There is calcific atherosclerotic changes of the abdominal aorta.  There is also disc degeneration at the T11-T12 disc space associated with circumferential mild annular disc bulge    Assessment:  Chau Price is a 66 y.o. year old male patient who has a past medical history of Other ulcerative colitis with rectal bleeding. He presents in referral from Dr. René Christine for back and leg pain.    1. Lumbar radiculopathy  methocarbamoL (ROBAXIN) 500 MG Tab    amitriptyline (ELAVIL) 25 MG tablet      2. History of lumbar spinal fusion        3. Lumbar spondylosis        4. History of fusion of cervical spine              Plan:  1. We reviewed his medications, pain and symptoms today.  2. At this time we will continue to maximize conservative therapy with rest and medications.  He is going to restart formal physical therapy.  3. I am going to increase his amitriptyline from 10 mg nightly to 25 mg nightly for the neuropathic component of his pain.  Robaxin provided for the myofascial component of his pain.  Discussed it can cause drowsiness.  4. Continue with Lyrica 75 mg b.i.d..  5. We discussed spinal  cord stimulator today and have previously provided him with a [place] pamphlet however at this time he would like to see how he progresses with continued formal physical therapy.  6. We discussed potential caudal RICHMOND however he states he has had 12-15 RICHMOND's in the past that did not provide him with any relief.  7. He requested a short course of hydrocodone today for severe pain.  I think this is reasonable, discussed to take sparingly.  Prescription sent to Dr. Travis for approval. I have reviewed the [place] Board of Pharmacy website and there are no abberancies.    8. Follow up as needed.        Thank you for referring this interesting patient, and I look forward to continuing to collaborate in his care.

## 2023-03-30 ENCOUNTER — PROCEDURE VISIT (OUTPATIENT)
Dept: UROLOGY | Facility: CLINIC | Age: 68
End: 2023-03-30
Payer: MEDICARE

## 2023-03-30 DIAGNOSIS — N40.1 NOCTURIA ASSOCIATED WITH BENIGN PROSTATIC HYPERPLASIA: ICD-10-CM

## 2023-03-30 DIAGNOSIS — N13.8 ENLARGED PROSTATE WITH URINARY OBSTRUCTION: Primary | ICD-10-CM

## 2023-03-30 DIAGNOSIS — R35.1 NOCTURIA ASSOCIATED WITH BENIGN PROSTATIC HYPERPLASIA: ICD-10-CM

## 2023-03-30 DIAGNOSIS — N40.1 ENLARGED PROSTATE WITH URINARY OBSTRUCTION: Primary | ICD-10-CM

## 2023-03-30 PROCEDURE — 52000 CYSTOURETHROSCOPY: CPT | Mod: PBBFAC,PO | Performed by: UROLOGY

## 2023-03-30 PROCEDURE — 52000 CYSTOURETHROSCOPY: CPT | Mod: S$PBB,,, | Performed by: UROLOGY

## 2023-03-30 PROCEDURE — 52000 CYSTOSCOPY: ICD-10-PCS | Mod: S$PBB,,, | Performed by: UROLOGY

## 2023-03-30 RX ORDER — OXYBUTYNIN CHLORIDE 5 MG/1
5 TABLET, EXTENDED RELEASE ORAL DAILY
Qty: 30 TABLET | Refills: 11 | Status: SHIPPED | OUTPATIENT
Start: 2023-03-30 | End: 2023-06-19

## 2023-03-30 NOTE — PROCEDURES
Cystoscopy    Date/Time: 3/30/2023 8:30 AM  Performed by: TORRIE Smith MD  Authorized by: Kelli Soto NP     Consent Done?:  Yes (Written)  Timeout: prior to procedure the correct patient, procedure, and site was verified    Prep: patient was prepped and draped in usual sterile fashion    Anesthesia:  Lidocaine jelly  Indications: BPH    Position:  Supine  Anesthesia:  Lidocaine jelly  Patient sedated?: No    Preparation: Patient was prepped and draped in usual sterile fashion    Scope type:  Flexible cystoscope   patient tolerated the procedure well with no immediate complications    Blood Loss:  None    67-year-old with BPH.  He underwent Urolift about 3 years ago at an outside facility.  He initially did well but complains now primarily of postvoid dribbling.  He is tried Flomax which has not had any improvement currently taking finasteride.  He is here for cystoscopy.    The flexible cystoscope was placed into the urethra and carefully advanced into the bladder.  A careful cystoscopic exam was then performed.  The entire bladder mucosa was systematically visualized.  Findings include moderate bladder wall trabeculation.  There were no lesions, masses foreign bodies or stones.   Each ureteral orifices were visualized and both had clear efflux of urine.  On retroflexion there was a small intravesical gland.  On each side of the bladder neck there was invagination of the mucosa and I suspect the bladder neck implants were placed ectopically into the bladder.  There was no overlying calcification or inflammation.  The cystoscope was then removed and I examined the entire length of the urethra.  The bladder neck appeared opened although somewhat CT I in appearance.  There was mild bilobar enlargement of the prostate otherwise the urethra appeared normal.  He tolerated the procedure well.  There were no complications    Impression:  Prostate is not overly enlarged.  I do not feel that there is an obstructive  component remaining in the prostate.  I suspect the bladder neck implants were placed ectopically although I do not feel that this is causing clinical symptoms.    Plan:  I will give him a trial of oxybutynin

## 2023-03-31 ENCOUNTER — TELEPHONE (OUTPATIENT)
Dept: PAIN MEDICINE | Facility: CLINIC | Age: 68
End: 2023-03-31
Payer: MEDICARE

## 2023-03-31 NOTE — TELEPHONE ENCOUNTER
----- Message from Saumya Wayne sent at 3/29/2023  3:32 PM CDT -----  Contact: Physical therapy  Type: Needs Medical Advice  Who Called:  [facility]    Best Call Back Number: [phone], Fax [phone]  Additional Information: Please send over a request for PT for patient.

## 2023-04-17 PROBLEM — I77.810 AORTIC ROOT DILATION: Status: ACTIVE | Noted: 2023-04-17

## 2023-04-28 ENCOUNTER — CLINICAL SUPPORT (OUTPATIENT)
Dept: OPHTHALMOLOGY | Facility: CLINIC | Age: 68
End: 2023-04-28
Payer: MEDICARE

## 2023-04-28 ENCOUNTER — OFFICE VISIT (OUTPATIENT)
Dept: OPTOMETRY | Facility: CLINIC | Age: 68
End: 2023-04-28
Payer: MEDICARE

## 2023-04-28 VITALS — BODY MASS INDEX: 33.82 KG/M2 | HEIGHT: 68 IN | WEIGHT: 223.13 LBS

## 2023-04-28 DIAGNOSIS — Z98.890 HISTORY OF REPAIR OF RETINAL TEAR BY LASER PHOTOCOAGULATION: ICD-10-CM

## 2023-04-28 DIAGNOSIS — H25.13 AGE-RELATED NUCLEAR CATARACT, BILATERAL: ICD-10-CM

## 2023-04-28 DIAGNOSIS — H52.4 HYPEROPIA WITH ASTIGMATISM AND PRESBYOPIA, BILATERAL: ICD-10-CM

## 2023-04-28 DIAGNOSIS — H40.1112 PRIMARY OPEN ANGLE GLAUCOMA (POAG) OF RIGHT EYE, MODERATE STAGE: Primary | ICD-10-CM

## 2023-04-28 DIAGNOSIS — H40.013 OPEN ANGLE WITH BORDERLINE FINDINGS AND LOW GLAUCOMA RISK IN BOTH EYES: ICD-10-CM

## 2023-04-28 DIAGNOSIS — H43.393 VITREOUS FLOATERS OF BOTH EYES: ICD-10-CM

## 2023-04-28 DIAGNOSIS — H40.1121 PRIMARY OPEN ANGLE GLAUCOMA (POAG) OF LEFT EYE, MILD STAGE: ICD-10-CM

## 2023-04-28 DIAGNOSIS — H52.203 HYPEROPIA WITH ASTIGMATISM AND PRESBYOPIA, BILATERAL: ICD-10-CM

## 2023-04-28 DIAGNOSIS — H52.03 HYPEROPIA WITH ASTIGMATISM AND PRESBYOPIA, BILATERAL: ICD-10-CM

## 2023-04-28 PROCEDURE — 99999 PR PBB SHADOW E&M-EST. PATIENT-LVL IV: ICD-10-PCS | Mod: PBBFAC,,,

## 2023-04-28 PROCEDURE — 92083 PR VISUAL FIELD EXAM,EXTENDED: ICD-10-PCS | Mod: 26,S$PBB,,

## 2023-04-28 PROCEDURE — 99214 OFFICE O/P EST MOD 30 MIN: CPT | Mod: PBBFAC,PO

## 2023-04-28 PROCEDURE — 92083 EXTENDED VISUAL FIELD XM: CPT | Mod: 26,S$PBB,,

## 2023-04-28 PROCEDURE — 92133 CPTRZD OPH DX IMG PST SGM ON: CPT | Mod: 26,S$PBB,,

## 2023-04-28 PROCEDURE — 99204 PR OFFICE/OUTPT VISIT, NEW, LEVL IV, 45-59 MIN: ICD-10-PCS | Mod: S$PBB,,,

## 2023-04-28 PROCEDURE — 92133 CPTRZD OPH DX IMG PST SGM ON: CPT | Mod: PBBFAC,PO

## 2023-04-28 PROCEDURE — 99204 OFFICE O/P NEW MOD 45 MIN: CPT | Mod: S$PBB,,,

## 2023-04-28 PROCEDURE — 92133 PR COMPUTERIZED OPHTHALMIC IMAGING OPTIC NERVE: ICD-10-PCS | Mod: 26,S$PBB,,

## 2023-04-28 PROCEDURE — 99999 PR PBB SHADOW E&M-EST. PATIENT-LVL IV: CPT | Mod: PBBFAC,,,

## 2023-04-28 PROCEDURE — 92083 EXTENDED VISUAL FIELD XM: CPT | Mod: PBBFAC,PO

## 2023-04-28 NOTE — PROGRESS NOTES
Pt completed HFV 24-2 sf. Pt sat well for test. Pt denies adhesive allergy. Non adhesive eye patch used. Pt will be seeing Dr. Tracy for results.

## 2023-04-29 NOTE — PROGRESS NOTES
HPI    Pt is coming here for a glaucoma check. Pt does state that his eyes are   getting worse and is considering getting glasses. Pt denies flashes. Pt   does have floaters. Pt did state that he did have a tear in retina and had   to get it fixed 5-6yrs ago. Pt denies pain. Pt is using latanoprost QHS   OU.     HTN is well controlled on meds.   Last edited by Gissel Tracy, OD on 4/29/2023 10:52 AM.            Assessment /Plan     For exam results, see Encounter Report.    Primary open angle glaucoma (POAG) of right eye, moderate stage  -     Patino Visual Field - OU - Extended - Both Eyes; Future; Expected date: 05/28/2023  -     Posterior Segment OCT Optic Nerve- Both eyes    Primary open angle glaucoma (POAG) of left eye, mild stage  -     Ambulatory referral/consult to Ophthalmology    Age-related nuclear cataract, bilateral    Vitreous floaters of both eyes    History of repair of retinal tear by laser photocoagulation    Hyperopia with astigmatism and presbyopia, bilateral      1-2. Pt reports having glaucoma for ~5 years. Previously monitored in [place]. Baseline testing obtained today shows moderate stage primary open angle glaucoma OD and mild stage OS. Will re-check IOP in 3 months.   -RNFL OCT 04/28/23:   OD: G(62), ST(82), T(57), IT(70), IN(63), N(50), SN(64); ONL   OS: G(76), ST(105), T(58), IT(103), IN(88), N(54), SN(86); Borderline   -Visual field 04/28/23: good reliability OU, superior arcuate and inferior nasal step OD, early superior and inferior nasal steps OS  -IOP 04/28/23: 09 // 08 mmHg  -Tmax: exact number unknown, pt states it was mid 20s.   -Pachs: unknown, will obtain at next visit   -CD ratio: 0.75 // 0.65  -Treatment: Latanoprost QHS OU (pt states only drop he has ever been on)    3. Mild, not yet visually significant. Surgery not recommended at this time. Ed pt on symptoms of worsening cataracts including reduced BCVA and glare. Monitor yearly for changes.    4-5. Pt has a history of  retinal tear with laser repair OD. Stable upon examination today. Reviewed signs and symptoms of retinal detachment thoroughly with pt and ed to call or message asap if experienced    6. Pt has tried PAL/bifocal in past and had could not adapt. Pt noticing decline in vision. Correctable to 20/20 with spec rx. Discussed spec options and released spec rx for  only. Monitor yearly for changes..    RTC: ~3 months for IOP check and pachymetry

## 2023-05-16 ENCOUNTER — PATIENT MESSAGE (OUTPATIENT)
Dept: PAIN MEDICINE | Facility: CLINIC | Age: 68
End: 2023-05-16
Payer: MEDICARE

## 2023-06-02 ENCOUNTER — TELEPHONE (OUTPATIENT)
Dept: PAIN MEDICINE | Facility: CLINIC | Age: 68
End: 2023-06-02
Payer: MEDICARE

## 2023-06-08 RX ORDER — HYDROCODONE BITARTRATE AND ACETAMINOPHEN 5; 325 MG/1; MG/1
1 TABLET ORAL EVERY 8 HOURS PRN
Qty: 21 TABLET | Refills: 0 | Status: SHIPPED | OUTPATIENT
Start: 2023-06-08 | End: 2023-07-06

## 2023-06-11 DIAGNOSIS — M54.16 LUMBAR RADICULOPATHY: ICD-10-CM

## 2023-06-12 RX ORDER — AMITRIPTYLINE HYDROCHLORIDE 25 MG/1
25 TABLET, FILM COATED ORAL NIGHTLY
Qty: 30 TABLET | Refills: 3 | Status: SHIPPED | OUTPATIENT
Start: 2023-06-12 | End: 2023-06-19

## 2023-07-28 ENCOUNTER — OFFICE VISIT (OUTPATIENT)
Dept: OPTOMETRY | Facility: CLINIC | Age: 68
End: 2023-07-28
Payer: MEDICARE

## 2023-07-28 DIAGNOSIS — H40.1112 PRIMARY OPEN ANGLE GLAUCOMA (POAG) OF RIGHT EYE, MODERATE STAGE: Primary | ICD-10-CM

## 2023-07-28 DIAGNOSIS — H40.1121 PRIMARY OPEN ANGLE GLAUCOMA (POAG) OF LEFT EYE, MILD STAGE: ICD-10-CM

## 2023-07-28 PROCEDURE — 99999 PR PBB SHADOW E&M-EST. PATIENT-LVL III: CPT | Mod: PBBFAC,,,

## 2023-07-28 PROCEDURE — 99999 PR PBB SHADOW E&M-EST. PATIENT-LVL III: ICD-10-PCS | Mod: PBBFAC,,,

## 2023-07-28 PROCEDURE — 99213 OFFICE O/P EST LOW 20 MIN: CPT | Mod: S$PBB,,,

## 2023-07-28 PROCEDURE — 99213 PR OFFICE/OUTPT VISIT, EST, LEVL III, 20-29 MIN: ICD-10-PCS | Mod: S$PBB,,,

## 2023-07-28 PROCEDURE — 99213 OFFICE O/P EST LOW 20 MIN: CPT | Mod: PBBFAC,PO

## 2023-07-28 RX ORDER — PANTOPRAZOLE SODIUM 20 MG/1
20 TABLET, DELAYED RELEASE ORAL
COMMUNITY
Start: 2023-05-09 | End: 2023-08-04

## 2023-07-28 RX ORDER — AMITRIPTYLINE HYDROCHLORIDE 25 MG/1
25 TABLET, FILM COATED ORAL
COMMUNITY
Start: 2023-07-13 | End: 2023-08-17 | Stop reason: SDUPTHER

## 2023-07-28 NOTE — PROGRESS NOTES
HPI     Glaucoma     Additional comments: 3 month iop ck           Comments    DLS: 4/28/23    Pt states no changes in va since last visit. No other problems noted.    Gtts: Latanoprost QHS OU          Last edited by Gissel Tracy, OD on 7/28/2023  9:06 AM.            Assessment /Plan     For exam results, see Encounter Report.    Primary open angle glaucoma (POAG) of right eye, moderate stage    Primary open angle glaucoma (POAG) of left eye, mild stage      1-2. IOP stable today on Latanoprost. Optic nerve stable with undilated 90. Continue Latanoprost QHS OU with good compliance.  -IOP   -09 // 09 - 07/28/23  -09 // 08 - 04/28/23  -RNFL OCT 04/28/23:   OD: G(62), ST(82), T(57), IT(70), IN(63), N(50), SN(64); ONL   OS: G(76), ST(105), T(58), IT(103), IN(88), N(54), SN(86); Borderline   -Visual field 04/28/23: good reliability OU, superior arcuate and inferior nasal step OD, early superior and inferior nasal steps OS  -Tmax: exact number unknown, pt states it was mid 20s.   -Pachs: 489 // 489  -CD ratio: 0.75 // 0.65  -Treatment: Latanoprost QHS OU (pt states only drop he has ever been on)    RTC: ~4 months for IOP check only

## 2023-08-02 ENCOUNTER — TELEPHONE (OUTPATIENT)
Dept: FAMILY MEDICINE | Facility: CLINIC | Age: 68
End: 2023-08-02
Payer: MEDICARE

## 2023-08-02 NOTE — TELEPHONE ENCOUNTER
Called patient regarding podiatry referral and next appointment was September so I gave patient office # in [place] and he will call and get in sooner. NEYMAR

## 2023-09-18 ENCOUNTER — OFFICE VISIT (OUTPATIENT)
Dept: ORTHOPEDICS | Facility: CLINIC | Age: 68
End: 2023-09-18
Payer: MEDICARE

## 2023-09-18 ENCOUNTER — HOSPITAL ENCOUNTER (OUTPATIENT)
Dept: RADIOLOGY | Facility: HOSPITAL | Age: 68
Discharge: HOME | End: 2023-09-18
Attending: ORTHOPAEDIC SURGERY
Payer: MEDICARE

## 2023-09-18 DIAGNOSIS — M25.531 PAIN IN RIGHT WRIST: ICD-10-CM

## 2023-09-18 DIAGNOSIS — M25.531 PAIN IN RIGHT WRIST: Primary | ICD-10-CM

## 2023-09-18 DIAGNOSIS — M67.40 GANGLION CYST: ICD-10-CM

## 2023-09-18 PROCEDURE — 73110 X-RAY EXAM OF WRIST: CPT | Mod: TC,PO,RT

## 2023-09-18 PROCEDURE — 99203 OFFICE O/P NEW LOW 30 MIN: CPT | Mod: S$PBB,,, | Performed by: ORTHOPAEDIC SURGERY

## 2023-09-18 PROCEDURE — 99999 PR PBB SHADOW E&M-EST. PATIENT-LVL III: CPT | Mod: PBBFAC,,, | Performed by: ORTHOPAEDIC SURGERY

## 2023-09-18 PROCEDURE — 99213 OFFICE O/P EST LOW 20 MIN: CPT | Mod: PBBFAC,PN | Performed by: ORTHOPAEDIC SURGERY

## 2023-09-18 PROCEDURE — 73110 X-RAY EXAM OF WRIST: CPT | Mod: 26,RT,, | Performed by: RADIOLOGY

## 2023-09-18 PROCEDURE — 99203 PR OFFICE/OUTPT VISIT, NEW, LEVL III, 30-44 MIN: ICD-10-PCS | Mod: S$PBB,,, | Performed by: ORTHOPAEDIC SURGERY

## 2023-09-18 PROCEDURE — 99999 PR PBB SHADOW E&M-EST. PATIENT-LVL III: ICD-10-PCS | Mod: PBBFAC,,, | Performed by: ORTHOPAEDIC SURGERY

## 2023-09-18 PROCEDURE — 73110 XR WRIST COMPLETE 3 VIEWS RIGHT: ICD-10-PCS | Mod: 26,RT,, | Performed by: RADIOLOGY

## 2023-09-18 NOTE — PROGRESS NOTES
9/18/2023    Chief Complaint:  Chief Complaint   Patient presents with    Right Wrist - Pain, Swelling, Numbness     Ganglion cyst       HPI:  Chau Price is a 67 y.o. male, who presents to clinic today has a history of a swelling or mass overlying the ulnar side of his right wrist.  He states that it was causing a significant amount of pain.  It did feel like a ball were well defined structure.  He states that 1 morning he woke up in the mass has gone down in his pain I have significantly improved.  He states that he is still having a little bit of sensory change on the side of his hand but otherwise he is not having any other pain or dysfunction.    PMHX:  Past Medical History:   Diagnosis Date    Glaucoma     Other ulcerative colitis with rectal bleeding        PSHX:  Past Surgical History:   Procedure Laterality Date    BACK SURGERY  2008 2008-2017; cervical fusion x2 & lumbar fusion x3    INCONTINENCE SURGERY      Urolift    REPAIR OF MENISCUS OF KNEE Left 2010    ROTATOR CUFF REPAIR Right 1997    TRANSFORAMINAL EPIDURAL INJECTION OF STEROID Left 10/21/2022    Procedure: Injection,steroid,epidural,transforaminal approach L4/5+L5/S1;  Surgeon: Diogo Travis MD;  Location: [facility] OR;  Service: Pain Management;  Laterality: Left;       FMHX:  Family History   Problem Relation Age of Onset    Cancer Mother         lung    Glaucoma Father     Cancer Father         bladder       SOCHX:  Social History     Tobacco Use    Smoking status: Former     Types: Cigars    Smokeless tobacco: Never   Substance Use Topics    Alcohol use: Yes     Comment: social       ALLERGIES:  Azathioprine and Mercaptopurine    CURRENT MEDICATIONS:  Current Outpatient Medications on File Prior to Visit   Medication Sig Dispense Refill    amitriptyline (ELAVIL) 25 MG tablet Take 1 tablet (25 mg total) by mouth every evening. 90 tablet 3    ascorbic acid, vitamin C, (VITAMIN C) 500 MG tablet Vitamin C 500 mg tablet   Take 1  tablet twice a day by oral route.      aspirin (ECOTRIN) 81 MG EC tablet Take 1 tablet (81 mg total) by mouth once daily. 90 tablet 3    augmented betamethasone dipropionate (DIPROLENE-AF) 0.05 % cream Apply topically 2 (two) times daily as needed. 50 g 3    CLENPIQ 10 mg-3.5 gram -12 gram/160 mL Soln use as directed      clobetasoL (TEMOVATE) 0.05 % cream clobetasol 0.05 % topical cream      diltiaZEM (CARDIZEM) 60 MG tablet Take 1 tablet (60 mg total) by mouth 2 (two) times daily. 180 tablet 3    fexofenadine (ALLEGRA) 180 MG tablet Allegra Allergy 180 mg tablet   Take 1 tablet every day by oral route.      fluticasone propionate (CUTIVATE) 0.05 % cream Apply topically once daily. 60 g 0    HYDROcodone-acetaminophen (NORCO) 7.5-325 mg per tablet Take 1 tablet by mouth every 8 (eight) hours as needed for Pain. 21 tablet 0    hydrocortisone butyrate (LOCOID) 0.1 % Lotn Apply 1 Application topically daily as needed. 59 mL 3    latanoprost 0.005 % ophthalmic solution INSTILL 1 DROP INTO EACH EYE IN THE EVENING 9 mL 3    losartan (COZAAR) 50 MG tablet Take 1 tablet (50 mg total) by mouth once daily. 90 tablet 3    mesalamine (LIALDA) 1.2 gram TbEC Take 4 tablets (4.8 g total) by mouth daily with breakfast. 360 tablet 4    methocarbamoL (ROBAXIN) 750 MG Tab Take 1 tablet (750 mg total) by mouth 4 (four) times daily as needed. 270 tablet 3    metoprolol tartrate (LOPRESSOR) 25 MG tablet Take 1 tablet (25 mg total) by mouth 2 (two) times daily. TAKE 1 TABLET BY MOUTH TWICE DAILY (Patient taking differently: Take 25 mg by mouth 2 (two) times daily. TAKE 1/2  TABLET BY MOUTH TWICE DAILY) 60 tablet 3    naproxen (NAPROSYN) 500 MG tablet naproxen 500 mg tablet      ondansetron (ZOFRAN-ODT) 4 MG TbDL Take 1 tablet (4 mg total) by mouth every 6 (six) hours as needed. 10 tablet 1    pantoprazole (PROTONIX) 20 MG tablet Take 1 tablet (20 mg total) by mouth once daily. 90 tablet 2    pantoprazole (PROTONIX) 40 MG tablet Take 1  tablet (40 mg total) by mouth once daily. 30 tablet 0    traZODone (DESYREL) 50 MG tablet Take 1 tablet (50 mg total) by mouth every evening. 90 tablet 4     No current facility-administered medications on file prior to visit.       REVIEW OF SYSTEMS:  Review of Systems   HENT: Negative.     Eyes: Negative.    Respiratory: Negative.     Cardiovascular: Negative.    Gastrointestinal: Negative.    Genitourinary: Negative.    Musculoskeletal: Negative.    Skin: Negative.    Neurological:  Positive for tingling.   Endo/Heme/Allergies: Negative.    Psychiatric/Behavioral: Negative.       GENERAL PHYSICAL EXAM:   There were no vitals taken for this visit.   GEN: well developed, well nourished, no acute distress   HENT: Normocephalic, atraumatic   EYES: No discharge, conjunctiva normal   NECK: Supple, non-tender   PULM: No wheezing, no respiratory distress   CV: RRR   ABD: Soft, non-tender    ORTHO EXAM:   Examination the right hand and wrist reveals that there are no major skin changes.  There is no significant edema.  Palpation produces no masses or tenderness.  Palpation over the ulnar side of the wrist is negative.  Flexion and extension of the wrist is not painful.  I am able to ulnar deviate him without pain.  He does report intact sensation grossly throughout the median radial ulnar distribution.  He is very mild decreased sensation over region of the hypothenar eminence.  He has capillary refill less than 2 seconds    RADIOLOGY:   X-rays of the right hand were taken in clinic today there are no fractures or dislocations noted.  There is a small soft tissue thickening overlying the ulnar styloid.    ASSESSMENT:   Right wrist ganglion    PLAN:  1. Will continue to monitor as it is decompressed at this time and not causing the patient any pain or dysfunction    2. Will follow up with me as needed.

## 2023-10-13 ENCOUNTER — TELEPHONE (OUTPATIENT)
Dept: PODIATRY | Facility: CLINIC | Age: 68
End: 2023-10-13
Payer: MEDICARE

## 2023-10-13 NOTE — TELEPHONE ENCOUNTER
----- Message from Ale Megan sent at 10/13/2023  9:48 AM CDT -----  Regarding: Reschedule Appt  Reschedule Appointment Request      Caller is requesting to reschedule an appointment.      Name of Caller: Pt     When is the first available appointment?       Would the patient rather a call back or a response via [facility]? Call back     Best Call Back Number: [phone]      Additional Information: Sts is not able to make appointment on Monday 10/16 due to unexpected  out of time. Would like to be rescheduled for a later date this month.  Please Advise - Thank you

## 2023-10-16 ENCOUNTER — TELEPHONE (OUTPATIENT)
Dept: PODIATRY | Facility: CLINIC | Age: 68
End: 2023-10-16
Payer: MEDICARE

## 2023-10-16 NOTE — TELEPHONE ENCOUNTER
LMOM that I changed his appt time due to Dr Liao will be in [place]. Call back if that appt time change is not good.

## 2023-10-17 ENCOUNTER — HOSPITAL ENCOUNTER (OUTPATIENT)
Dept: RADIOLOGY | Facility: HOSPITAL | Age: 68
Discharge: HOME | End: 2023-10-17
Attending: PODIATRIST
Payer: MEDICARE

## 2023-10-17 ENCOUNTER — OFFICE VISIT (OUTPATIENT)
Dept: PODIATRY | Facility: CLINIC | Age: 68
End: 2023-10-17
Payer: MEDICARE

## 2023-10-17 DIAGNOSIS — M79.671 RIGHT FOOT PAIN: ICD-10-CM

## 2023-10-17 PROCEDURE — 73630 X-RAY EXAM OF FOOT: CPT | Mod: 26,RT,, | Performed by: RADIOLOGY

## 2023-10-17 PROCEDURE — 99999 PR PBB SHADOW E&M-EST. PATIENT-LVL III: ICD-10-PCS | Mod: PBBFAC,,, | Performed by: PODIATRIST

## 2023-10-17 PROCEDURE — 99203 PR OFFICE/OUTPT VISIT, NEW, LEVL III, 30-44 MIN: ICD-10-PCS | Mod: S$PBB,,, | Performed by: PODIATRIST

## 2023-10-17 PROCEDURE — 73630 XR FOOT COMPLETE 3 VIEW RIGHT: ICD-10-PCS | Mod: 26,RT,, | Performed by: RADIOLOGY

## 2023-10-17 PROCEDURE — 99999 PR PBB SHADOW E&M-EST. PATIENT-LVL III: CPT | Mod: PBBFAC,,, | Performed by: PODIATRIST

## 2023-10-17 PROCEDURE — 99203 OFFICE O/P NEW LOW 30 MIN: CPT | Mod: S$PBB,,, | Performed by: PODIATRIST

## 2023-10-17 PROCEDURE — 99213 OFFICE O/P EST LOW 20 MIN: CPT | Mod: PBBFAC,PO | Performed by: PODIATRIST

## 2023-10-17 PROCEDURE — 73630 X-RAY EXAM OF FOOT: CPT | Mod: TC,PO,RT

## 2023-10-17 NOTE — PROGRESS NOTES
Subjective:     Patient ID: Chau Price is a 68 y.o. male.    Chief Complaint: Foot Pain (R lateral side of foot pain for the past 3-4 months. Pt is unable to wear regular shoes, can only wear sandals due to pain. )    Chau is a 68 y.o. male with a past medical history of Glaucoma, Hypertension, Other ulcerative colitis with rectal bleeding, and Sleep apnea. The patient's chief complaint consists of Rt. Lateral forefoot/midfoot pain that has been present for months.  Describes pain as sharp and localized to the site of a prominent styloid process.  Notes developing a thick callus to the affected area that is a source of pain with all weight bearing.  Currently denies pain from the site on account of rest.  Notes being limited with use of closed toe shoe gear, as this continues to supply pressure to the area.  Inquires as to potential treatment  options.      Past Medical History:   Diagnosis Date    Glaucoma     Hypertension     Other ulcerative colitis with rectal bleeding     Sleep apnea     USES CPAP       Past Surgical History:   Procedure Laterality Date    BACK SURGERY  2008 2008-2017; cervical fusion x2 & lumbar fusion x3    INCONTINENCE SURGERY      Urolift    LEFT HEART CATHETERIZATION  9/21/2023    Procedure: Left heart cath;  Surgeon: Lakeisha Mcdaniel MD;  Location: [facility] CATH;  Service: Cardiology;;    REPAIR OF MENISCUS OF KNEE Left 2010    ROTATOR CUFF REPAIR Right 1997    TRANSFORAMINAL EPIDURAL INJECTION OF STEROID Left 10/21/2022    Procedure: Injection,steroid,epidural,transforaminal approach L4/5+L5/S1;  Surgeon: Diogo Travis MD;  Location: [facility] OR;  Service: Pain Management;  Laterality: Left;       Family History   Problem Relation Age of Onset    Cancer Mother         lung    Glaucoma Father     Cancer Father         bladder       Social History     Socioeconomic History    Marital status:    Tobacco Use    Smoking status: Former     Types: Cigars    Smokeless  tobacco: Never   Substance and Sexual Activity    Alcohol use: Not Currently     Comment: social       Current Outpatient Medications   Medication Sig Dispense Refill    amitriptyline (ELAVIL) 25 MG tablet Take 1 tablet (25 mg total) by mouth every evening. 90 tablet 3    ascorbic acid, vitamin C, (VITAMIN C) 500 MG tablet Vitamin C 500 mg tablet   Take 1 tablet twice a day by oral route.      aspirin (ECOTRIN) 81 MG EC tablet Take 1 tablet (81 mg total) by mouth once daily. 90 tablet 3    augmented betamethasone dipropionate (DIPROLENE-AF) 0.05 % cream Apply topically 2 (two) times daily as needed. 50 g 3    CLENPIQ 10 mg-3.5 gram -12 gram/160 mL Soln use as directed      clobetasoL (TEMOVATE) 0.05 % cream clobetasol 0.05 % topical cream      diltiaZEM (CARDIZEM) 60 MG tablet Take 1 tablet (60 mg total) by mouth 2 (two) times daily. 180 tablet 3    fexofenadine (ALLEGRA) 180 MG tablet Allegra Allergy 180 mg tablet   Take 1 tablet every day by oral route.      fluticasone propionate (CUTIVATE) 0.05 % cream Apply topically once daily. 60 g 0    hydrocortisone butyrate (LOCOID) 0.1 % Lotn Apply 1 Application topically daily as needed. 59 mL 3    latanoprost 0.005 % ophthalmic solution INSTILL 1 DROP INTO EACH EYE IN THE EVENING 9 mL 3    losartan (COZAAR) 50 MG tablet Take 1 tablet (50 mg total) by mouth once daily. 90 tablet 3    mesalamine (LIALDA) 1.2 gram TbEC Take 4 tablets (4.8 g total) by mouth daily with breakfast. 360 tablet 4    methocarbamoL (ROBAXIN) 750 MG Tab Take 1 tablet (750 mg total) by mouth 4 (four) times daily as needed. 270 tablet 3    metoprolol tartrate (LOPRESSOR) 25 MG tablet Take 1 tablet (25 mg total) by mouth 2 (two) times daily. 180 tablet 1    naproxen (NAPROSYN) 500 MG tablet 2 (two) times daily as needed.      ondansetron (ZOFRAN-ODT) 4 MG TbDL Take 1 tablet (4 mg total) by mouth every 6 (six) hours as needed. 10 tablet 1    pantoprazole (PROTONIX) 20 MG tablet Take 1 tablet (20 mg  "total) by mouth once daily. 90 tablet 2    pantoprazole (PROTONIX) 40 MG tablet Take 1 tablet (40 mg total) by mouth once daily. 30 tablet 0    traZODone (DESYREL) 50 MG tablet Take 1 tablet (50 mg total) by mouth every evening. 90 tablet 4     No current facility-administered medications for this visit.       Review of patient's allergies indicates:   Allergen Reactions    Azathioprine Other (See Comments)     Megacolon, "colon almost burst"  Other reaction(s): Not available    Mercaptopurine Other (See Comments)     Megacolon, "colon almost burst"        Hemoglobin A1C   Date Value Ref Range Status   06/01/2023 5.0 0.0 - 5.6 % Final     Comment:     Reference Interval:  5.0 - 5.6 Normal   5.7 - 6.4 High Risk   > 6.5 Diabetic      Hgb A1c results are standardized based on the (NGSP) National   Glycohemoglobin Standardization Program.      Hemoglobin A1C levels are related to mean serum/plasma glucose   during the preceding 2-3 months.            Review of Systems   Constitutional: Negative for chills and fever.   Skin:  Positive for suspicious lesions. Negative for color change and nail changes.   Musculoskeletal:  Positive for myalgias. Negative for muscle cramps and muscle weakness.   Gastrointestinal:  Negative for nausea and vomiting.   Neurological:  Negative for numbness and paresthesias.   Psychiatric/Behavioral:  Negative for altered mental status.         Objective:     Physical Exam  Constitutional:       Appearance: Normal appearance. He is not ill-appearing.   Cardiovascular:      Pulses:           Dorsalis pedis pulses are 2+ on the right side and 2+ on the left side.        Posterior tibial pulses are 2+ on the right side and 2+ on the left side.      Comments: CFT is < 3 seconds bilateral.  Pedal hair growth is present bilateral.  No lower extremity edema noted bilateral.  Toes are warm to touch bilateral.    Musculoskeletal:         General: Tenderness present.      Comments: Muscle strength 5/5 in " all muscle groups bilateral.  No tenderness nor crepitation with ROM of foot/ankle joints bilateral.  Pain with palpation to the lesion overlying the Rt. Styloid process.  Prominent styloid process noted bilateral, Rt. > Lt.     Skin:     Capillary Refill: Capillary refill takes 2 to 3 seconds.      Findings: Lesion present. No bruising, ecchymosis, erythema, signs of injury, laceration, petechiae or rash.      Comments: Pedal skin has normal turgor, temperature, and texture bilateral.  Toenails x 10 appear normotrophic. Porokeratosis noted to the lateral aspect of the Rt. Styloid process.        Neurological:      General: No focal deficit present.      Mental Status: He is alert.      Sensory: No sensory deficit.      Motor: No atrophy.      Comments: Light touch is intact bilateral.             Assessment:      Encounter Diagnosis   Name Primary?    Right foot pain      Plan:     Chau was seen today for foot pain.    Diagnoses and all orders for this visit:    Right foot pain  -     Ambulatory referral/consult to Podiatry  -     X-Ray Foot Complete Right; Future      I counseled the patient on his conditions, their implications and medical management.    Orders written for an x-ray of the Rt. Foot.  Personally evaluated said films which is consistent with a prominent styloid process.      Briefly discussed resection of the said prominence.  Patient is amenable to said plan.    Patient to obtain surgical clearance  per his PCP.      Will place a case request pending clearance.  Written informed consent to be obtained the day of the procedure.    Patient to be NPO after midnight the day of the procedure.    RTC 1 week post op for a follow up.     Emmanuel Liao DPM

## 2023-10-18 ENCOUNTER — TELEPHONE (OUTPATIENT)
Dept: PODIATRY | Facility: CLINIC | Age: 68
End: 2023-10-18
Payer: MEDICARE

## 2023-10-18 NOTE — TELEPHONE ENCOUNTER
Patient was notified of results and recommendations. He gave verbal understanding. He will let us know what he decides.

## 2024-01-23 ENCOUNTER — OFFICE VISIT (OUTPATIENT)
Dept: OPTOMETRY | Facility: CLINIC | Age: 69
End: 2024-01-23
Payer: MEDICARE

## 2024-01-23 DIAGNOSIS — H40.1112 PRIMARY OPEN ANGLE GLAUCOMA (POAG) OF RIGHT EYE, MODERATE STAGE: Primary | ICD-10-CM

## 2024-01-23 DIAGNOSIS — H40.1121 PRIMARY OPEN ANGLE GLAUCOMA (POAG) OF LEFT EYE, MILD STAGE: ICD-10-CM

## 2024-01-23 PROCEDURE — 99213 OFFICE O/P EST LOW 20 MIN: CPT | Mod: PBBFAC,PO

## 2024-01-23 PROCEDURE — 99999 PR PBB SHADOW E&M-EST. PATIENT-LVL III: CPT | Mod: PBBFAC,,,

## 2024-01-23 PROCEDURE — 99213 OFFICE O/P EST LOW 20 MIN: CPT | Mod: S$PBB,,,

## 2024-01-23 NOTE — PROGRESS NOTES
HPI    Pt here for 4 month IOP ck     Pt has annual exam 05/28. Pt using Latanoprost QHS OU. Pt missing about   1-2 a week. Pt sts he does not like wearing glasses, doing w/o.   Last edited by Lena Franco on 1/23/2024  9:41 AM.            Assessment /Plan     For exam results, see Encounter Report.    Primary open angle glaucoma (POAG) of right eye, moderate stage  -     Patino Visual Field - OU - Extended - Both Eyes; Future; Expected date: 05/30/2024    Primary open angle glaucoma (POAG) of left eye, mild stage  -     Patino Visual Field - OU - Extended - Both Eyes; Future; Expected date: 05/30/2024      1-2. Pt admits to missing Latanoprost 1-2x week. IOP slightly elevated from previous today. Emphasized importance of strict compliance with drops to prevent worsening visual field loss . Optic nerve appearance stable with undilated 90 OU. Continue Latanoprost QHS OU. Pt to return in 4 months for annual with dilation, RNFL OCT, and updated 24-2 HVF  -IOP   -16 // 14 - 01/23/24  -09 // 09 - 07/28/23  -09 // 08 - 04/28/23  -Tmax: unknown, pt states it was mid 20s  -RNFL OCT   -04/28/23 - Baseline   -OD: G(62), ST(82), T(57), IT(70), IN(63), N(50), SN(64); ONL   -OS: G(76), ST(105), T(58), IT(103), IN(88), N(54), SN(86); Borderline   -Visual field   -04/28/23 - Baseline  -OD: reliable, superior arcuate and inferior nasal step OD  -OS: reliable, early superior and inferior nasal steps OS  -Pachs: 489 // 489  -Treatment: Latanoprost QHS OU (pt states only drop he has ever been on)     RTC: ~4 months for annual exam with RNFL OCT and updated 24-2 HVF

## 2024-01-26 ENCOUNTER — OFFICE VISIT (OUTPATIENT)
Dept: PODIATRY | Facility: CLINIC | Age: 69
End: 2024-01-26
Payer: MEDICARE

## 2024-01-26 DIAGNOSIS — M79.671 RIGHT FOOT PAIN: Primary | ICD-10-CM

## 2024-01-26 DIAGNOSIS — M89.8X9 BONY PROMINENCE: ICD-10-CM

## 2024-01-26 PROCEDURE — 99213 OFFICE O/P EST LOW 20 MIN: CPT | Mod: S$PBB,,, | Performed by: PODIATRIST

## 2024-01-27 NOTE — PROGRESS NOTES
Subjective:     Patient ID: Chau Price is a 68 y.o. male.    Chief Complaint: No chief complaint on file.  Patient presents to clinic for a follow up regarding sharp, localized pain at the site of the Rt. prominent styloid process.  He continues developing a painful callus to the affected area that is a source of pain with all weight bearing.  Currently denies pain from the site on account of rest.  He continues to be limited in use of shoe gear, as most shoes supply pressure to the affected area.  Would like to further discuss surgical options.  Denies any additional pedal complaints.      Past Medical History:   Diagnosis Date    Glaucoma     Hypertension     Other ulcerative colitis with rectal bleeding     Sleep apnea     USES CPAP       Past Surgical History:   Procedure Laterality Date    BACK SURGERY  2008 2008-2017; cervical fusion x2 & lumbar fusion x3    INCONTINENCE SURGERY      Urolift    LEFT HEART CATHETERIZATION  9/21/2023    Procedure: Left heart cath;  Surgeon: Lakeisha Mcdaniel MD;  Location: [facility] CATH;  Service: Cardiology;;    REPAIR OF MENISCUS OF KNEE Left 2010    ROTATOR CUFF REPAIR Right 1997    TRANSFORAMINAL EPIDURAL INJECTION OF STEROID Left 10/21/2022    Procedure: Injection,steroid,epidural,transforaminal approach L4/5+L5/S1;  Surgeon: Diogo Travis MD;  Location: [facility] OR;  Service: Pain Management;  Laterality: Left;       Family History   Problem Relation Age of Onset    Cancer Mother         lung    Glaucoma Father     Cancer Father         bladder    Macular degeneration Neg Hx        Social History     Socioeconomic History    Marital status:    Tobacco Use    Smoking status: Former     Types: Cigars    Smokeless tobacco: Never   Substance and Sexual Activity    Alcohol use: Not Currently     Comment: social       Current Outpatient Medications   Medication Sig Dispense Refill    amitriptyline (ELAVIL) 25 MG tablet Take 1 tablet (25 mg total) by mouth  every evening. 90 tablet 3    ascorbic acid, vitamin C, (VITAMIN C) 500 MG tablet Vitamin C 500 mg tablet   Take 1 tablet twice a day by oral route.      aspirin (ECOTRIN) 81 MG EC tablet Take 1 tablet (81 mg total) by mouth once daily. 90 tablet 3    augmented betamethasone dipropionate (DIPROLENE-AF) 0.05 % cream Apply topically 2 (two) times daily as needed. 50 g 3    CLENPIQ 10 mg-3.5 gram -12 gram/160 mL Soln use as directed      clobetasoL (TEMOVATE) 0.05 % cream clobetasol 0.05 % topical cream      diltiaZEM (DILACOR XR) 120 MG CDCR Take 1 capsule (120 mg total) by mouth once daily. 90 capsule 3    fexofenadine (ALLEGRA) 180 MG tablet Allegra Allergy 180 mg tablet   Take 1 tablet every day by oral route.      fluticasone propionate (CUTIVATE) 0.05 % cream Apply topically once daily. 60 g 0    HYDROcodone-acetaminophen (NORCO) 5-325 mg per tablet Take 1 tablet by mouth every 8 (eight) hours as needed for Pain. 21 tablet 0    hydrocortisone butyrate (LOCOID) 0.1 % Lotn Apply 1 Application topically daily as needed. 59 mL 3    latanoprost 0.005 % ophthalmic solution INSTILL 1 DROP INTO EACH EYE IN THE EVENING 9 mL 3    losartan (COZAAR) 50 MG tablet Take 1 tablet (50 mg total) by mouth once daily. 90 tablet 3    mesalamine (LIALDA) 1.2 gram TbEC Take 4 tablets (4.8 g total) by mouth daily with breakfast. 360 tablet 4    methocarbamoL (ROBAXIN) 750 MG Tab Take 1 tablet (750 mg total) by mouth 4 (four) times daily as needed. 270 tablet 3    metoprolol succinate (TOPROL-XL) 25 MG 24 hr tablet Take 1 tablet (25 mg total) by mouth once daily. 90 tablet 3    naproxen (NAPROSYN) 500 MG tablet 2 (two) times daily as needed.      ondansetron (ZOFRAN-ODT) 4 MG TbDL Take 1 tablet (4 mg total) by mouth every 6 (six) hours as needed. 10 tablet 1    pantoprazole (PROTONIX) 20 MG tablet Take 1 tablet (20 mg total) by mouth once daily. 90 tablet 2    pantoprazole (PROTONIX) 40 MG tablet Take 1 tablet (40 mg total) by mouth once  "daily. 30 tablet 0    traZODone (DESYREL) 50 MG tablet Take 1 tablet (50 mg total) by mouth every evening. 90 tablet 4     No current facility-administered medications for this visit.       Review of patient's allergies indicates:   Allergen Reactions    Azathioprine Other (See Comments)     Megacolon, "colon almost burst"  Other reaction(s): Not available    Mercaptopurine Other (See Comments)     Megacolon, "colon almost burst"        Hemoglobin A1C   Date Value Ref Range Status   06/01/2023 5.0 0.0 - 5.6 % Final     Comment:     Reference Interval:  5.0 - 5.6 Normal   5.7 - 6.4 High Risk   > 6.5 Diabetic      Hgb A1c results are standardized based on the (NGSP) National   Glycohemoglobin Standardization Program.      Hemoglobin A1C levels are related to mean serum/plasma glucose   during the preceding 2-3 months.            Review of Systems   Constitutional: Negative for chills and fever.   Skin:  Positive for suspicious lesions. Negative for color change and nail changes.   Musculoskeletal:  Positive for myalgias. Negative for muscle cramps and muscle weakness.   Gastrointestinal:  Negative for nausea and vomiting.   Neurological:  Negative for numbness and paresthesias.   Psychiatric/Behavioral:  Negative for altered mental status.         Objective:     Physical Exam  Constitutional:       Appearance: Normal appearance. He is not ill-appearing.   Cardiovascular:      Pulses:           Dorsalis pedis pulses are 2+ on the right side and 2+ on the left side.        Posterior tibial pulses are 2+ on the right side and 2+ on the left side.      Comments: CFT is < 3 seconds bilateral.  Pedal hair growth is present bilateral.  No lower extremity edema noted bilateral.  Toes are warm to touch bilateral.    Musculoskeletal:         General: Tenderness present.      Comments: Muscle strength 5/5 in all muscle groups bilateral.  No tenderness nor crepitation with ROM of foot/ankle joints bilateral.  Pain with palpation " to the lesion overlying the Rt. Styloid process.  Prominent styloid process noted bilateral, Rt. > Lt.     Skin:     Capillary Refill: Capillary refill takes 2 to 3 seconds.      Findings: Lesion present. No bruising, ecchymosis, erythema, signs of injury, laceration, petechiae or rash.      Comments: Pedal skin has normal turgor, temperature, and texture bilateral.  Toenails x 10 appear normotrophic. Porokeratosis noted to the lateral aspect of the Rt. Styloid process.        Neurological:      General: No focal deficit present.      Mental Status: He is alert.      Sensory: No sensory deficit.      Motor: No atrophy.      Comments: Light touch is intact bilateral.           Assessment:      Encounter Diagnoses   Name Primary?    Right foot pain Yes    Bony prominence      Plan:     Diagnoses and all orders for this visit:    Right foot pain    Bony prominence      I counseled the patient on his conditions, their implications and medical management.    Briefly reviewed the results of the past x-rays, which are consistent with a prominent styloid process of the Rt. 5th metatarsal.    Briefly discussed resection of the said prominence.  Patient is amenable to said plan.    Patient to obtain surgical clearance  per his PCP.      Will place a case request pending clearance.  Written informed consent to be obtained the day of the procedure.    Patient to be NPO after midnight the day of the procedure.    RTC 1 week post op for a follow up.     Emmanuel Liao DPM

## 2024-02-05 PROBLEM — I27.20 PULMONARY HYPERTENSION: Status: ACTIVE | Noted: 2024-02-05

## 2024-02-16 ENCOUNTER — TELEPHONE (OUTPATIENT)
Dept: PODIATRY | Facility: CLINIC | Age: 69
End: 2024-02-16
Payer: MEDICARE

## 2024-02-16 DIAGNOSIS — M89.8X9 BONY PROMINENCE: ICD-10-CM

## 2024-02-16 DIAGNOSIS — M79.671 RIGHT FOOT PAIN: Primary | ICD-10-CM

## 2024-02-16 NOTE — TELEPHONE ENCOUNTER
----- Message from Minnie Morales sent at 2/16/2024  1:02 PM CST -----  Type:  Patient Returning Call    Who Called:pt      Who Left Message for Patient: doesn't remember    Does the patient know what this is regarding?:surgery    Would the patient rather a call back or a response via [facility]? Call back    Best Call Back Number:[phone]      Additional Information:      Please call Back to advise. Thanks!

## 2024-02-16 NOTE — TELEPHONE ENCOUNTER
Called and spoke with pt stated nobody had told nor called back and gave him his sx date I spoke with pt and told him his sx date

## 2024-02-27 ENCOUNTER — OFFICE VISIT (OUTPATIENT)
Dept: OTOLARYNGOLOGY | Facility: CLINIC | Age: 69
End: 2024-02-27
Payer: MEDICARE

## 2024-02-27 VITALS — HEIGHT: 68 IN | WEIGHT: 208.75 LBS | BODY MASS INDEX: 31.64 KG/M2

## 2024-02-27 DIAGNOSIS — G47.33 OSA (OBSTRUCTIVE SLEEP APNEA): ICD-10-CM

## 2024-02-27 DIAGNOSIS — J32.4 CHRONIC PANSINUSITIS: ICD-10-CM

## 2024-02-27 PROCEDURE — 99214 OFFICE O/P EST MOD 30 MIN: CPT | Mod: PBBFAC,PO | Performed by: OTOLARYNGOLOGY

## 2024-02-27 PROCEDURE — 99999 PR PBB SHADOW E&M-EST. PATIENT-LVL IV: CPT | Mod: PBBFAC,,, | Performed by: OTOLARYNGOLOGY

## 2024-02-27 PROCEDURE — 99204 OFFICE O/P NEW MOD 45 MIN: CPT | Mod: S$PBB,,, | Performed by: OTOLARYNGOLOGY

## 2024-02-27 NOTE — PROGRESS NOTES
Subjective:       Patient ID: Chau Price is a 68 y.o. male.    Chief Complaint: Snoring (Options for GAYATRI)    Chau is here for Obstructive sleep apnea and intolerance of CPAP.  he was diagnosed with GAYATRI < 1 yr years ago.   Last sleep study: 12/2023. JO / AHI: 7.5; oxygen sofia: 86% (2 minutes total < 90%)  he has issues with CPAP compliance: pulling off the mask periodically.  He was found to be in non-compliance with PAP because he was pulling it off in the middle of the night last yr. He scheduled an appt with me, but since was able to get another PAP and has been using since January / December. Since resuming his PAP, compliant most nights > 4 hours. Wears every night. He wears a full face mask because of chronic nasal congestion issues. History of CRSwP, Flonase intermittent  ESS - 0  Body mass index is 31.74 kg/m².   Has lost 20 lb since Mar of last yr.      Pertinent medical issues: Htn, Pulm htn  Anticoagulation: none  Pertinent surgery: none    Patient validated questionnaires (if applicable):      %            No data to display                   No data to display                   No data to display                     Social History     Tobacco Use   Smoking Status Former    Types: Cigars   Smokeless Tobacco Never     Social History     Substance and Sexual Activity   Alcohol Use Not Currently    Comment: social          Objective:        Constitutional:   He is oriented to person, place, and time. He appears well-developed and well-nourished. He appears alert. He is active. Normal speech.      Head:  Normocephalic and atraumatic. Head is without TMJ tenderness. No scars. Salivary glands normal.  Facial strength is normal.      Ears:    Right Ear: No drainage or swelling. No middle ear effusion.   Left Ear: No drainage or swelling.  No middle ear effusion.     Nose:  Mucosal edema, septal deviation and polyps (polypoid change MT bilaterally) present. No rhinorrhea or sinus tenderness. No  turbinate hypertrophy.      Mouth/Throat  Oropharynx clear and moist without lesions or asymmetry, normal uvula midline and mirror exam normal. Normal dentition. No uvula swelling, lacerations or trismus. No oropharyngeal exudate. Tonsillar erythema, tonsillar exudate.      Neck:  Full range of motion with neck supple and no adenopathy. Thyroid tenderness is present. No tracheal deviation, no edema, no erythema, normal range of motion, no stridor, no crepitus and no neck rigidity present. No thyroid mass present.     Cardiovascular:    Intact distal pulses and normal pulses.              Pulmonary/Chest:   Effort normal and breath sounds normal. No stridor.     Psychiatric:   His speech is normal and behavior is normal. His mood appears not anxious. His affect is not labile.     Neurological:   He is alert and oriented to person, place, and time. No sensory deficit.     Skin:   No abrasions, lacerations, lesions, or rashes. No abrasion and no bruising noted.         Tests / Results:  I personally reviewed the HST/PSG as noted above in the HPI    Assessment:       1. BMI 31.0-31.9,adult    2. GAYATRI (obstructive sleep apnea)    3. Chronic pansinusitis          Plan:         Discussed background of GAYATRI at length including medical therapy and surgical therapy  He is improved with PAP compliance. Continue this. Mild GAYATRI so minimal surgical options available / recommended.  We discussed continued weight loss may cure GAYATRI - if he loses another 20 lb. Can repeat HST. He will message me  Surgery now would be aimed at improving nasal patency if desired. For now, resume INS regularly.

## 2024-02-29 ENCOUNTER — PATIENT MESSAGE (OUTPATIENT)
Dept: PODIATRY | Facility: CLINIC | Age: 69
End: 2024-02-29
Payer: MEDICARE

## 2024-03-01 DIAGNOSIS — G89.18 POSTOPERATIVE PAIN: Primary | ICD-10-CM

## 2024-03-01 RX ORDER — HYDROCODONE BITARTRATE AND ACETAMINOPHEN 5; 325 MG/1; MG/1
1 TABLET ORAL EVERY 4 HOURS PRN
Qty: 42 TABLET | Refills: 0 | Status: SHIPPED | OUTPATIENT
Start: 2024-03-01

## 2024-03-02 ENCOUNTER — NURSE TRIAGE (OUTPATIENT)
Dept: ADMINISTRATIVE | Facility: CLINIC | Age: 69
End: 2024-03-02
Payer: MEDICARE

## 2024-03-02 NOTE — TELEPHONE ENCOUNTER
Pt had foot surgery this past Wednesday. Pt experiencing more pain than he anticipated. Spoke with md terrazas and was advised to shorten the window between his pills and pt is now running out of pills. He only has 3 left and is still experiencing severe pain with ambulation. Pt states pain meds do relieve the pain for some time which is why he is concerned about running out over the weekend.     Dispo- home care. Per chart review, advised pt dr. Liao did send in a refill of his pain med yesterday to his preferred pharmacy and receipt was confirmed by pharmacy. Advised pt to call [facility] to confirm med is ready for pickup and to call back with any further issues. He VU.  Reason for Disposition   Patient has refills remaining on their prescription    Protocols used: Medication Refill and Renewal Call-A-

## 2024-03-05 ENCOUNTER — OFFICE VISIT (OUTPATIENT)
Dept: PODIATRY | Facility: CLINIC | Age: 69
End: 2024-03-05
Payer: MEDICARE

## 2024-03-05 ENCOUNTER — TELEPHONE (OUTPATIENT)
Dept: PODIATRY | Facility: CLINIC | Age: 69
End: 2024-03-05
Payer: MEDICARE

## 2024-03-05 VITALS — HEIGHT: 68 IN | WEIGHT: 198.44 LBS | BODY MASS INDEX: 30.07 KG/M2

## 2024-03-05 DIAGNOSIS — Z98.890 POSTOPERATIVE STATE: Primary | ICD-10-CM

## 2024-03-05 PROCEDURE — 99212 OFFICE O/P EST SF 10 MIN: CPT | Mod: PBBFAC,PO | Performed by: PODIATRIST

## 2024-03-05 PROCEDURE — 99999 PR PBB SHADOW E&M-EST. PATIENT-LVL II: CPT | Mod: PBBFAC,,, | Performed by: PODIATRIST

## 2024-03-05 PROCEDURE — 99024 POSTOP FOLLOW-UP VISIT: CPT | Mod: POP,,, | Performed by: PODIATRIST

## 2024-03-05 NOTE — TELEPHONE ENCOUNTER
----- Message from Emmanuel Liao DPM sent at 3/5/2024  5:56 AM CST -----  Hey, somehow I was double booked for my afternoon.  This is going to put be behind picking up my kids.  Have Dee come in at 12:30 and then tell Dana to come in for 2pm.  Thanks!

## 2024-03-12 ENCOUNTER — OFFICE VISIT (OUTPATIENT)
Dept: PODIATRY | Facility: CLINIC | Age: 69
End: 2024-03-12
Payer: MEDICARE

## 2024-03-12 DIAGNOSIS — Z98.890 POSTOPERATIVE STATE: Primary | ICD-10-CM

## 2024-03-12 PROCEDURE — 99024 POSTOP FOLLOW-UP VISIT: CPT | Mod: POP,,, | Performed by: PODIATRIST

## 2024-03-12 NOTE — LETTER
March 12, 2024      [facility]  [address] [place] [address]  Phone: [phone]       Patient: Chau Price   YOB: 1955  Date of Visit: 03/12/2024    To Whom It May Concern:    Diana Price  was at [facility] on 03/12/2024. The patient may return to work/school on 3/19/24 with no restrictions. If you have any questions or concerns, or if I can be of further assistance, please do not hesitate to contact me.    Sincerely,    Emmanuel Liao DPM

## 2024-03-13 NOTE — PROGRESS NOTES
Subjective:     Patient ID: Chau Price is a 68 y.o. male.    Chief Complaint: No chief complaint on file.  Patient presents to clinic 2weeks S/P resection of a bone spur along the base of the Rt. 5th metatarsal.  Notes mild pain from the incision line with today's exam.  Rates pain as a 3/10 with weight bearing that is alleviated with rest.  Has kept the prior football dressing clean, dry, and intact for the past week.  Notes minimizing ambulation while in the postoperative shoe.  Denies experiencing N/V/F/C/D.  Denies experiencing chest pain, SOB, and calf/thigh pain.  Denies any additional pedal complaints.        Past Medical History:   Diagnosis Date    Anticoagulant long-term use     Arthritis     Glaucoma     Hypertension     Other ulcerative colitis with rectal bleeding     Sleep apnea     USES C-PAP       Past Surgical History:   Procedure Laterality Date    BACK SURGERY  2008 2008-2017; cervical fusion x2 & lumbar fusion x3    INCONTINENCE SURGERY      Urolift    LEFT HEART CATHETERIZATION  9/21/2023    Procedure: Left heart cath;  Surgeon: Lakeisha Mcdaniel MD;  Location: [facility] CATH;  Service: Cardiology;;    REPAIR OF MENISCUS OF KNEE Left 2010    ROTATOR CUFF REPAIR Right 1997    SURGICAL REMOVAL OF BONE SPUR Right 2/28/2024    Procedure: EXCISION, BONE SPUR- 5th metatarsal;  Surgeon: Emmanuel Liao DPM;  Location: [facility] OR;  Service: Podiatry;  Laterality: Right;    TRANSFORAMINAL EPIDURAL INJECTION OF STEROID Left 10/21/2022    Procedure: Injection,steroid,epidural,transforaminal approach L4/5+L5/S1;  Surgeon: Diogo Travis MD;  Location: [facility] OR;  Service: Pain Management;  Laterality: Left;       Family History   Problem Relation Age of Onset    Cancer Mother         lung    Glaucoma Father     Cancer Father         bladder    Macular degeneration Neg Hx        Social History     Socioeconomic History    Marital status:    Tobacco Use    Smoking status: Former     Types:  Cigars    Smokeless tobacco: Never   Substance and Sexual Activity    Alcohol use: Not Currently     Comment: social       Current Outpatient Medications   Medication Sig Dispense Refill    amitriptyline (ELAVIL) 25 MG tablet Take 1 tablet (25 mg total) by mouth every evening. 90 tablet 3    ascorbic acid, vitamin C, (VITAMIN C) 500 MG tablet Take 500 mg by mouth once daily.      aspirin (ECOTRIN) 81 MG EC tablet Take 1 tablet (81 mg total) by mouth once daily. 90 tablet 3    augmented betamethasone dipropionate (DIPROLENE-AF) 0.05 % cream APPLY  CREAM TOPICALLY TWICE DAILY AS NEEDED 50 g 0    clobetasoL (TEMOVATE) 0.05 % cream Apply topically.      diltiaZEM (DILACOR XR) 120 MG CDCR Take 1 capsule (120 mg total) by mouth once daily. 90 capsule 3    fexofenadine (ALLEGRA) 180 MG tablet Take 180 mg by mouth once daily.      fluticasone propionate (CUTIVATE) 0.05 % cream Apply topically once daily. 60 g 0    HYDROcodone-acetaminophen (NORCO) 5-325 mg per tablet Take 1 tablet by mouth every 4 (four) hours as needed for Pain. 42 tablet 0    HYDROcodone-acetaminophen (NORCO) 7.5-325 mg per tablet Take 1 tablet by mouth every 8 (eight) hours as needed for Pain. 21 tablet 0    hydrocortisone butyrate (LOCOID) 0.1 % Lotn Apply 1 Application topically daily as needed. 59 mL 3    latanoprost 0.005 % ophthalmic solution INSTILL 1 DROP INTO EACH EYE IN THE EVENING (Patient taking differently: Place 1 drop into both eyes every evening. INSTILL 1 DROP INTO EACH EYE IN THE EVENING) 9 mL 3    losartan (COZAAR) 50 MG tablet Take 1 tablet (50 mg total) by mouth once daily. 90 tablet 3    mesalamine (LIALDA) 1.2 gram TbEC Take 4 tablets (4.8 g total) by mouth daily with breakfast. 360 tablet 4    methocarbamoL (ROBAXIN) 750 MG Tab Take 1 tablet (750 mg total) by mouth 4 (four) times daily as needed. 270 tablet 3    metoprolol succinate (TOPROL-XL) 25 MG 24 hr tablet Take 1 tablet (25 mg total) by mouth once daily. 90 tablet 3     "naproxen (NAPROSYN) 500 MG tablet Take 500 mg by mouth 2 (two) times daily as needed.      omega 3-dha-epa-fish oil (FISH OIL) 1,200 (144-216) mg Cap Take 1 capsule by mouth Daily.      ondansetron (ZOFRAN-ODT) 4 MG TbDL Take 1 tablet (4 mg total) by mouth every 6 (six) hours as needed. 10 tablet 1    pantoprazole (PROTONIX) 20 MG tablet Take 1 tablet (20 mg total) by mouth once daily. 90 tablet 2    traZODone (DESYREL) 50 MG tablet Take 1 tablet (50 mg total) by mouth every evening. 90 tablet 4     No current facility-administered medications for this visit.     Facility-Administered Medications Ordered in Other Visits   Medication Dose Route Frequency Provider Last Rate Last Admin    lactated ringers infusion   Intravenous Continuous Radha Bishop MD   New Bag at 02/28/24 1438    LIDOcaine (PF) 10 mg/ml (1%) injection 10 mg  1 mL Intradermal Once Radha Bishop MD           Review of patient's allergies indicates:   Allergen Reactions    Azathioprine Other (See Comments)     Megacolon, "colon almost burst"  Other reaction(s): Not available    Mercaptopurine Other (See Comments)     Megacolon, "colon almost burst"        Hemoglobin A1C   Date Value Ref Range Status   06/01/2023 5.0 0.0 - 5.6 % Final     Comment:     Reference Interval:  5.0 - 5.6 Normal   5.7 - 6.4 High Risk   > 6.5 Diabetic      Hgb A1c results are standardized based on the (NGSP) National   Glycohemoglobin Standardization Program.      Hemoglobin A1C levels are related to mean serum/plasma glucose   during the preceding 2-3 months.            Review of Systems   Constitutional: Negative for chills and fever.   Skin:  Negative for color change, nail changes and suspicious lesions.   Musculoskeletal:  Positive for myalgias. Negative for muscle cramps and muscle weakness.   Gastrointestinal:  Negative for nausea and vomiting.   Neurological:  Negative for numbness and paresthesias.   Psychiatric/Behavioral:  Negative for altered mental " status.         Objective:     Physical Exam  Constitutional:       Appearance: Normal appearance. He is not ill-appearing.   Cardiovascular:      Pulses:           Dorsalis pedis pulses are 2+ on the right side and 2+ on the left side.        Posterior tibial pulses are 2+ on the right side and 2+ on the left side.      Comments: CFT is < 3 seconds bilateral.  Pedal hair growth is present bilateral.  Mild localized edema overlying the Rt. Styloid process.  Toes are warm to touch bilateral.    Musculoskeletal:         General: Tenderness present.      Comments: Muscle strength 5/5 in all muscle groups bilateral.  No tenderness nor crepitation with ROM of foot/ankle joints bilateral.  Pain with palpation to the incision overlying the Rt. Styloid process.  Prominent styloid process noted on the Lt.   Skin:     Capillary Refill: Capillary refill takes 2 to 3 seconds.      Findings: No bruising, ecchymosis, erythema, signs of injury, laceration, lesion, petechiae or rash.      Comments: Incision overlying the Rt. Styloid process is well approximated with all suture intact.  No evidence of dehiscence, necrosis, ischemia, or infection the length of the incision.     Neurological:      General: No focal deficit present.      Mental Status: He is alert.      Sensory: No sensory deficit.      Motor: No atrophy.      Comments: Light touch is intact bilateral.             Assessment:      Encounter Diagnosis   Name Primary?    Postoperative state Yes     Plan:     Diagnoses and all orders for this visit:    Postoperative state      I counseled the patient on his conditions, their implications and medical management.    Overall, satisfactory postoperative results noted.  Incision remains well maintained with suture, with no evidence of postoperative infection.     With the patient's verbal consent, a sterile suture removal kit was used to remove all sutures without incident.  Patient tolerated this quite well.    The site was  painted with betadine and covered with a mepilex border.  To be reapplied QD x 1 week to allow for further skin maturation along the incision line.     May begin to increase his weight bearing activity in a supportive shoe.    To continue keeping the site covered while showering.     RTC in 1 week for a final postop visit.       Emmanuel Liao DPM

## 2024-03-14 ENCOUNTER — TELEPHONE (OUTPATIENT)
Dept: PODIATRY | Facility: CLINIC | Age: 69
End: 2024-03-14
Payer: MEDICARE

## 2024-03-14 NOTE — TELEPHONE ENCOUNTER
----- Message from Kenny Mcallister sent at 3/14/2024  2:27 PM CDT -----  Contact: pt  Type: Needs Medical Advice  Who Called: pt  Best Call Back Number: [phone]    Additional Information: Pt is calling the office trying to give an update.Please call back and advise.

## 2024-03-14 NOTE — TELEPHONE ENCOUNTER
Foot is not doing well, pain after wearing a shoe after an hour foot is swollen  pain about a 7 when walking and about  2-3 when off of it Want to know if he should start antibiotics.

## 2024-03-15 ENCOUNTER — CLINICAL SUPPORT (OUTPATIENT)
Dept: PODIATRY | Facility: CLINIC | Age: 69
End: 2024-03-15
Payer: MEDICARE

## 2024-03-15 VITALS — WEIGHT: 198.44 LBS | HEIGHT: 68 IN | BODY MASS INDEX: 30.07 KG/M2

## 2024-03-15 DIAGNOSIS — B99.9 INFECTION: Primary | ICD-10-CM

## 2024-03-15 PROCEDURE — 87070 CULTURE OTHR SPECIMN AEROBIC: CPT | Performed by: PODIATRIST

## 2024-03-15 PROCEDURE — 99999 PR PBB SHADOW E&M-EST. PATIENT-LVL III: CPT | Mod: PBBFAC,,,

## 2024-03-15 PROCEDURE — 87075 CULTR BACTERIA EXCEPT BLOOD: CPT | Performed by: PODIATRIST

## 2024-03-15 PROCEDURE — 99213 OFFICE O/P EST LOW 20 MIN: CPT | Mod: PBBFAC,PO

## 2024-03-15 NOTE — PROGRESS NOTES
Patient seen for dressing change. Wound culture collected with 2 pt IDs used. Betadine applied with 3 cast padding and ace wrap used. Pt ambulating in darco shoe. RTC in one week for PO visit with provider

## 2024-03-15 NOTE — TELEPHONE ENCOUNTER
Offered to do dressing change with Betadine but patient states he can do it himself. Advised to elevate and ice and he voiced understanding. RTC in one week for next PO.

## 2024-03-18 LAB — BACTERIA SPEC AEROBE CULT: NO GROWTH

## 2024-03-19 ENCOUNTER — TELEPHONE (OUTPATIENT)
Dept: PODIATRY | Facility: CLINIC | Age: 69
End: 2024-03-19
Payer: MEDICARE

## 2024-03-19 NOTE — TELEPHONE ENCOUNTER
----- Message from Emmanuel Liao DPM sent at 3/19/2024  9:26 AM CDT -----  Please let the patient know initial cultures are negative for infection.  We will keep him posted on the 2nd set once available.    ----- Message -----  From: [facility]  Sent: 3/16/2024   7:14 AM CDT  To: Emmanuel Liao DPM

## 2024-03-21 ENCOUNTER — OFFICE VISIT (OUTPATIENT)
Dept: PODIATRY | Facility: CLINIC | Age: 69
End: 2024-03-21
Payer: MEDICARE

## 2024-03-21 VITALS — WEIGHT: 198.44 LBS | BODY MASS INDEX: 30.07 KG/M2 | HEIGHT: 68 IN

## 2024-03-21 DIAGNOSIS — G89.18 POSTOPERATIVE PAIN: ICD-10-CM

## 2024-03-21 DIAGNOSIS — Z98.890 POSTOPERATIVE STATE: Primary | ICD-10-CM

## 2024-03-21 PROCEDURE — 99212 OFFICE O/P EST SF 10 MIN: CPT | Mod: PBBFAC,PO | Performed by: PODIATRIST

## 2024-03-21 PROCEDURE — 99999 PR PBB SHADOW E&M-EST. PATIENT-LVL II: CPT | Mod: PBBFAC,,, | Performed by: PODIATRIST

## 2024-03-21 PROCEDURE — 99024 POSTOP FOLLOW-UP VISIT: CPT | Mod: POP,,, | Performed by: PODIATRIST

## 2024-03-21 NOTE — PROGRESS NOTES
Subjective:     Patient ID: Chau Price is a 68 y.o. male.    Chief Complaint: Post-op Evaluation  Patient presents to clinic 3 weeks S/P resection of a bone spur along the base of the Rt. 5th metatarsal.  Relates 7/10 pain from the site with weight bearing that is alleviated with rest.  Has kept the prior football dressing clean, dry, and intact since last week.  Has reverted back to the surgical shoe for all ambulation.  Denies experiencing N/V/F/C/D. Denies any additional pedal complaints.        Past Medical History:   Diagnosis Date    Anticoagulant long-term use     Arthritis     Glaucoma     Hypertension     Other ulcerative colitis with rectal bleeding     Sleep apnea     USES C-PAP       Past Surgical History:   Procedure Laterality Date    BACK SURGERY  2008 2008-2017; cervical fusion x2 & lumbar fusion x3    INCONTINENCE SURGERY      Urolift    LEFT HEART CATHETERIZATION  9/21/2023    Procedure: Left heart cath;  Surgeon: Lakeisha Mcdaniel MD;  Location: [facility] CATH;  Service: Cardiology;;    REPAIR OF MENISCUS OF KNEE Left 2010    ROTATOR CUFF REPAIR Right 1997    SURGICAL REMOVAL OF BONE SPUR Right 2/28/2024    Procedure: EXCISION, BONE SPUR- 5th metatarsal;  Surgeon: Emmanuel Liao DPM;  Location: [facility] OR;  Service: Podiatry;  Laterality: Right;    TRANSFORAMINAL EPIDURAL INJECTION OF STEROID Left 10/21/2022    Procedure: Injection,steroid,epidural,transforaminal approach L4/5+L5/S1;  Surgeon: Diogo Travis MD;  Location: [facility] OR;  Service: Pain Management;  Laterality: Left;       Family History   Problem Relation Age of Onset    Cancer Mother         lung    Glaucoma Father     Cancer Father         bladder    Macular degeneration Neg Hx        Social History     Socioeconomic History    Marital status:    Tobacco Use    Smoking status: Former     Types: Cigars    Smokeless tobacco: Never   Substance and Sexual Activity    Alcohol use: Not Currently     Comment: social        Current Outpatient Medications   Medication Sig Dispense Refill    amitriptyline (ELAVIL) 25 MG tablet Take 1 tablet (25 mg total) by mouth every evening. 90 tablet 3    ascorbic acid, vitamin C, (VITAMIN C) 500 MG tablet Take 500 mg by mouth once daily.      aspirin (ECOTRIN) 81 MG EC tablet Take 1 tablet (81 mg total) by mouth once daily. 90 tablet 3    augmented betamethasone dipropionate (DIPROLENE-AF) 0.05 % cream APPLY  CREAM TOPICALLY TWICE DAILY AS NEEDED 50 g 0    clobetasoL (TEMOVATE) 0.05 % cream Apply topically.      diltiaZEM (DILACOR XR) 120 MG CDCR Take 1 capsule (120 mg total) by mouth once daily. 90 capsule 3    fexofenadine (ALLEGRA) 180 MG tablet Take 180 mg by mouth once daily.      fluticasone propionate (CUTIVATE) 0.05 % cream Apply topically once daily. 60 g 0    HYDROcodone-acetaminophen (NORCO) 5-325 mg per tablet Take 1 tablet by mouth every 4 (four) hours as needed for Pain. 42 tablet 0    HYDROcodone-acetaminophen (NORCO) 7.5-325 mg per tablet Take 1 tablet by mouth every 8 (eight) hours as needed for Pain. 21 tablet 0    hydrocortisone butyrate (LOCOID) 0.1 % Lotn Apply 1 Application topically daily as needed. 59 mL 3    latanoprost 0.005 % ophthalmic solution INSTILL 1 DROP INTO EACH EYE IN THE EVENING (Patient taking differently: Place 1 drop into both eyes every evening. INSTILL 1 DROP INTO EACH EYE IN THE EVENING) 9 mL 3    losartan (COZAAR) 50 MG tablet Take 1 tablet (50 mg total) by mouth once daily. 90 tablet 3    mesalamine (LIALDA) 1.2 gram TbEC Take 4 tablets (4.8 g total) by mouth daily with breakfast. 360 tablet 4    methocarbamoL (ROBAXIN) 750 MG Tab Take 1 tablet (750 mg total) by mouth 4 (four) times daily as needed. 270 tablet 3    metoprolol succinate (TOPROL-XL) 25 MG 24 hr tablet Take 1 tablet (25 mg total) by mouth once daily. 90 tablet 3    naproxen (NAPROSYN) 500 MG tablet Take 500 mg by mouth 2 (two) times daily as needed.      omega 3-dha-epa-fish oil (FISH  "OIL) 1,200 (144-216) mg Cap Take 1 capsule by mouth Daily.      ondansetron (ZOFRAN-ODT) 4 MG TbDL Take 1 tablet (4 mg total) by mouth every 6 (six) hours as needed. 10 tablet 1    pantoprazole (PROTONIX) 20 MG tablet Take 1 tablet (20 mg total) by mouth once daily. 90 tablet 2    traZODone (DESYREL) 50 MG tablet Take 1 tablet (50 mg total) by mouth every evening. 90 tablet 4     No current facility-administered medications for this visit.     Facility-Administered Medications Ordered in Other Visits   Medication Dose Route Frequency Provider Last Rate Last Admin    lactated ringers infusion   Intravenous Continuous Radha Bishop MD   New Bag at 02/28/24 1438    LIDOcaine (PF) 10 mg/ml (1%) injection 10 mg  1 mL Intradermal Once Radha Bishop MD           Review of patient's allergies indicates:   Allergen Reactions    Azathioprine Other (See Comments)     Megacolon, "colon almost burst"  Other reaction(s): Not available    Mercaptopurine Other (See Comments)     Megacolon, "colon almost burst"        Hemoglobin A1C   Date Value Ref Range Status   06/01/2023 5.0 0.0 - 5.6 % Final     Comment:     Reference Interval:  5.0 - 5.6 Normal   5.7 - 6.4 High Risk   > 6.5 Diabetic      Hgb A1c results are standardized based on the (NGSP) National   Glycohemoglobin Standardization Program.      Hemoglobin A1C levels are related to mean serum/plasma glucose   during the preceding 2-3 months.            Review of Systems   Constitutional: Negative for chills and fever.   Skin:  Negative for color change, nail changes and suspicious lesions.   Musculoskeletal:  Positive for myalgias. Negative for muscle cramps and muscle weakness.   Gastrointestinal:  Negative for nausea and vomiting.   Neurological:  Negative for numbness and paresthesias.   Psychiatric/Behavioral:  Negative for altered mental status.         Objective:     Physical Exam  Constitutional:       Appearance: Normal appearance. He is not ill-appearing. "   Cardiovascular:      Pulses:           Dorsalis pedis pulses are 2+ on the right side and 2+ on the left side.        Posterior tibial pulses are 2+ on the right side and 2+ on the left side.      Comments: CFT is < 3 seconds bilateral.  Pedal hair growth is present bilateral.  Mild localized edema overlying the Rt. Styloid process.  Toes are warm to touch bilateral.    Musculoskeletal:         General: Swelling and tenderness present.      Comments: Muscle strength 5/5 in all muscle groups bilateral.  No tenderness nor crepitation with ROM of foot/ankle joints bilateral.  Pain with palpation to the incision overlying the Rt. Styloid process.  Prominent styloid process noted on the Lt.   Skin:     Capillary Refill: Capillary refill takes 2 to 3 seconds.      Findings: No bruising, ecchymosis, erythema, signs of injury, laceration, lesion, petechiae or rash.      Comments: Incision overlying the Rt. Styloid process remains well healed.     Neurological:      General: No focal deficit present.      Mental Status: He is alert.      Sensory: No sensory deficit.      Motor: No atrophy.      Comments: Light touch is intact bilateral.             Assessment:      Encounter Diagnoses   Name Primary?    Postoperative state Yes    Postoperative pain      Plan:     Chau was seen today for post-op evaluation.    Diagnoses and all orders for this visit:    Postoperative state    Postoperative pain      I counseled the patient on his conditions, their implications and medical management.    Overall, satisfactory postoperative results noted.  Incision remains well healed.    The site was painted with betadine, tubigrip compression was applied, and a modified Chacon compression dressing was then applied.       Given a new DARCO shoe to better offload the site.  If no improvement noted, within two days, advised to obtain a CAM walker off of [employer].    To continue keeping the site covered while showering.    Advised to resume  elevation of the extremity and to minimize weight bearing steps.     RTC in 2 weeks for a follow up.     Emmanuel Liao DPM

## 2024-03-23 LAB — BACTERIA SPEC ANAEROBE CULT: NORMAL

## 2024-03-25 ENCOUNTER — TELEPHONE (OUTPATIENT)
Dept: PODIATRY | Facility: CLINIC | Age: 69
End: 2024-03-25
Payer: MEDICARE

## 2024-03-25 NOTE — TELEPHONE ENCOUNTER
Spoke with patient regarding his lab results. Patient expressed understanding of the results. Patient requested a referral from the dr for physical therapy. The information was taken and routed to dr. Liao.

## 2024-03-25 NOTE — TELEPHONE ENCOUNTER
----- Message from Emmanuel Liao DPM sent at 3/24/2024  8:27 AM CDT -----  Please let the patient know final cultures are negative for infection.  ----- Message -----  From: [facility]  Sent: 3/16/2024   7:14 AM CDT  To: Emmanuel Liao DPM

## 2024-03-26 ENCOUNTER — TELEPHONE (OUTPATIENT)
Dept: PODIATRY | Facility: CLINIC | Age: 69
End: 2024-03-26
Payer: MEDICARE

## 2024-03-26 DIAGNOSIS — Z98.890 POSTOPERATIVE STATE: Primary | ICD-10-CM

## 2024-03-26 DIAGNOSIS — G89.18 POSTOPERATIVE PAIN: ICD-10-CM

## 2024-03-26 NOTE — TELEPHONE ENCOUNTER
Spoke with the patient about the referral information that he requested to be sent to Dr. Liao for PT. Notified him that the orders were put in. Patient expressed understanding of the message.

## 2024-03-26 NOTE — TELEPHONE ENCOUNTER
----- Message from Gabriela Snyder sent at 3/26/2024  8:58 AM CDT -----  non-specific  Sofi is calling from [facility] for pt Chau Price  She needs oder faxed for the therapy for this patient  fax- [phone] phone   Please call back to reschedule

## 2024-04-04 ENCOUNTER — OFFICE VISIT (OUTPATIENT)
Dept: PODIATRY | Facility: CLINIC | Age: 69
End: 2024-04-04
Payer: MEDICARE

## 2024-04-04 VITALS — BODY MASS INDEX: 30.07 KG/M2 | WEIGHT: 198.44 LBS | HEIGHT: 68 IN

## 2024-04-04 DIAGNOSIS — S84.91XA NEURAPRAXIA OF RIGHT LOWER EXTREMITY, INITIAL ENCOUNTER: ICD-10-CM

## 2024-04-04 DIAGNOSIS — Z98.890 POSTOPERATIVE STATE: Primary | ICD-10-CM

## 2024-04-04 PROCEDURE — 99999 PR PBB SHADOW E&M-EST. PATIENT-LVL II: CPT | Mod: PBBFAC,,, | Performed by: PODIATRIST

## 2024-04-04 PROCEDURE — 99024 POSTOP FOLLOW-UP VISIT: CPT | Mod: POP,,, | Performed by: PODIATRIST

## 2024-04-04 PROCEDURE — 99212 OFFICE O/P EST SF 10 MIN: CPT | Mod: PBBFAC,PO | Performed by: PODIATRIST

## 2024-04-04 RX ORDER — GABAPENTIN 600 MG/1
600 TABLET ORAL DAILY
Qty: 30 TABLET | Refills: 0 | Status: SHIPPED | OUTPATIENT
Start: 2024-04-04 | End: 2024-05-04

## 2024-04-04 NOTE — PROGRESS NOTES
"Subjective:     Patient ID: Chau Price is a 68 y.o. male.    Chief Complaint: Post-op Evaluation  Patient presents to clinic 5 weeks S/P resection of a bone spur along the base of the Rt. 5th metatarsal.  He continues to note 2/10 pain from the site with weight bearing that is alleviated with rest.  Relates starting PT with some improvement noted, however, he continues to experience "nerve like" pain along the incision site while at rest.  Inquires as to how this can be treated.  Notes attempting to weight bear to tolerance in a pair of Aguilar Adrenaline. Denies any additional pedal complaints.        Past Medical History:   Diagnosis Date    Anticoagulant long-term use     Arthritis     Glaucoma     Hypertension     Other ulcerative colitis with rectal bleeding     Sleep apnea     USES C-PAP       Past Surgical History:   Procedure Laterality Date    BACK SURGERY  2008 2008-2017; cervical fusion x2 & lumbar fusion x3    INCONTINENCE SURGERY      Urolift    LEFT HEART CATHETERIZATION  9/21/2023    Procedure: Left heart cath;  Surgeon: Lakeisha Mcdaniel MD;  Location: [facility] CATH;  Service: Cardiology;;    REPAIR OF MENISCUS OF KNEE Left 2010    ROTATOR CUFF REPAIR Right 1997    SURGICAL REMOVAL OF BONE SPUR Right 2/28/2024    Procedure: EXCISION, BONE SPUR- 5th metatarsal;  Surgeon: Emmanuel Liao DPM;  Location: [facility] OR;  Service: Podiatry;  Laterality: Right;    TRANSFORAMINAL EPIDURAL INJECTION OF STEROID Left 10/21/2022    Procedure: Injection,steroid,epidural,transforaminal approach L4/5+L5/S1;  Surgeon: Diogo Travis MD;  Location: [facility] OR;  Service: Pain Management;  Laterality: Left;       Family History   Problem Relation Age of Onset    Cancer Mother         lung    Glaucoma Father     Cancer Father         bladder    Macular degeneration Neg Hx        Social History     Socioeconomic History    Marital status:    Tobacco Use    Smoking status: Former     Types: Cigars    " Smokeless tobacco: Never   Substance and Sexual Activity    Alcohol use: Not Currently     Comment: social       Current Outpatient Medications   Medication Sig Dispense Refill    amitriptyline (ELAVIL) 25 MG tablet Take 1 tablet (25 mg total) by mouth every evening. 90 tablet 3    ascorbic acid, vitamin C, (VITAMIN C) 500 MG tablet Take 500 mg by mouth once daily.      aspirin (ECOTRIN) 81 MG EC tablet Take 1 tablet (81 mg total) by mouth once daily. 90 tablet 3    augmented betamethasone dipropionate (DIPROLENE-AF) 0.05 % cream APPLY  CREAM TOPICALLY TO AFFECTED AREA TWICE DAILY AS NEEDED 50 g 0    clobetasoL (TEMOVATE) 0.05 % cream Apply topically.      diltiaZEM (DILACOR XR) 120 MG CDCR Take 1 capsule (120 mg total) by mouth once daily. 90 capsule 3    fexofenadine (ALLEGRA) 180 MG tablet Take 180 mg by mouth once daily.      fluticasone propionate (CUTIVATE) 0.05 % cream Apply topically once daily. 60 g 0    gabapentin (NEURONTIN) 600 MG tablet Take 1 tablet (600 mg total) by mouth once daily. 30 tablet 0    HYDROcodone-acetaminophen (NORCO) 5-325 mg per tablet Take 1 tablet by mouth every 4 (four) hours as needed for Pain. 42 tablet 0    HYDROcodone-acetaminophen (NORCO) 7.5-325 mg per tablet Take 1 tablet by mouth every 8 (eight) hours as needed for Pain. 21 tablet 0    hydrocortisone butyrate (LOCOID) 0.1 % Lotn Apply 1 Application topically daily as needed. 59 mL 3    latanoprost 0.005 % ophthalmic solution INSTILL 1 DROP INTO EACH EYE IN THE EVENING (Patient taking differently: Place 1 drop into both eyes every evening. INSTILL 1 DROP INTO EACH EYE IN THE EVENING) 9 mL 3    losartan (COZAAR) 50 MG tablet Take 1 tablet (50 mg total) by mouth once daily. 90 tablet 3    mesalamine (LIALDA) 1.2 gram TbEC Take 4 tablets (4.8 g total) by mouth daily with breakfast. 360 tablet 4    methocarbamoL (ROBAXIN) 750 MG Tab Take 1 tablet (750 mg total) by mouth 4 (four) times daily as needed. 270 tablet 3    metoprolol  "succinate (TOPROL-XL) 25 MG 24 hr tablet Take 1 tablet (25 mg total) by mouth once daily. 90 tablet 3    naproxen (NAPROSYN) 500 MG tablet Take 500 mg by mouth 2 (two) times daily as needed.      omega 3-dha-epa-fish oil (FISH OIL) 1,200 (144-216) mg Cap Take 1 capsule by mouth Daily.      ondansetron (ZOFRAN-ODT) 4 MG TbDL Take 1 tablet (4 mg total) by mouth every 6 (six) hours as needed. 10 tablet 1    pantoprazole (PROTONIX) 20 MG tablet Take 1 tablet (20 mg total) by mouth once daily. 90 tablet 2    traZODone (DESYREL) 50 MG tablet Take 1 tablet (50 mg total) by mouth every evening. 90 tablet 4     No current facility-administered medications for this visit.     Facility-Administered Medications Ordered in Other Visits   Medication Dose Route Frequency Provider Last Rate Last Admin    lactated ringers infusion   Intravenous Continuous Radha Bishop MD   New Bag at 02/28/24 1438    LIDOcaine (PF) 10 mg/ml (1%) injection 10 mg  1 mL Intradermal Once Radha Bishop MD           Review of patient's allergies indicates:   Allergen Reactions    Azathioprine Other (See Comments)     Megacolon, "colon almost burst"  Other reaction(s): Not available    Mercaptopurine Other (See Comments)     Megacolon, "colon almost burst"        Hemoglobin A1C   Date Value Ref Range Status   06/01/2023 5.0 0.0 - 5.6 % Final     Comment:     Reference Interval:  5.0 - 5.6 Normal   5.7 - 6.4 High Risk   > 6.5 Diabetic      Hgb A1c results are standardized based on the (NGSP) National   Glycohemoglobin Standardization Program.      Hemoglobin A1C levels are related to mean serum/plasma glucose   during the preceding 2-3 months.            Review of Systems   Constitutional: Negative for chills and fever.   Skin:  Negative for color change, nail changes and suspicious lesions.   Musculoskeletal:  Positive for myalgias. Negative for muscle cramps and muscle weakness.   Gastrointestinal:  Negative for nausea and vomiting. "   Neurological:  Negative for numbness and paresthesias.   Psychiatric/Behavioral:  Negative for altered mental status.         Objective:     Physical Exam  Constitutional:       Appearance: Normal appearance. He is not ill-appearing.   Cardiovascular:      Pulses:           Dorsalis pedis pulses are 2+ on the right side and 2+ on the left side.        Posterior tibial pulses are 2+ on the right side and 2+ on the left side.      Comments: CFT is < 3 seconds bilateral.  Pedal hair growth is present bilateral.  Mild localized edema overlying the Rt. Styloid process.  Toes are warm to touch bilateral.    Musculoskeletal:         General: Swelling and tenderness present.      Comments: Muscle strength 5/5 in all muscle groups bilateral.  No tenderness nor crepitation with ROM of foot/ankle joints bilateral.  Less pain with palpation to the incision overlying the Rt. Styloid process.  Prominent styloid process noted on the Lt.   Skin:     Capillary Refill: Capillary refill takes 2 to 3 seconds.      Findings: No bruising, ecchymosis, erythema, signs of injury, laceration, lesion, petechiae or rash.      Comments: Incision overlying the Rt. Styloid process remains well healed.     Neurological:      General: No focal deficit present.      Mental Status: He is alert.      Sensory: No sensory deficit.      Motor: No atrophy.      Comments: Light touch is intact bilateral.             Assessment:      Encounter Diagnoses   Name Primary?    Postoperative state Yes    Neurapraxia of right lower extremity, initial encounter      Plan:     Chau was seen today for post-op evaluation.    Diagnoses and all orders for this visit:    Postoperative state    Neurapraxia of right lower extremity, initial encounter  -     gabapentin (NEURONTIN) 600 MG tablet; Take 1 tablet (600 mg total) by mouth once daily.      I counseled the patient on his conditions, their implications and medical management.    Overall, satisfactory postoperative  results noted.  Incision remains well healed, with some improvement noted in the peripheral edema.    Advised to continue use of the tubigrip stocking.    Recommend switching back to a Aguilar Beast, as this will accommodate for said swelling better than the Aguilar Adrenaline.      Patient to resume PT.    May weight bear to tolerance going forward, however, advised to rest when the site becomes overly symptomatic.       Rx written for 600mg gabapentin PO QD to aid in neuropraxia associated pain at the surgical site.      RTC in 2 weeks for a follow up.     Emmanuel Liao DPM

## 2024-04-05 ENCOUNTER — PATIENT MESSAGE (OUTPATIENT)
Dept: OTHER | Facility: OTHER | Age: 69
End: 2024-04-05
Payer: MEDICARE

## 2024-04-15 ENCOUNTER — TELEPHONE (OUTPATIENT)
Dept: PODIATRY | Facility: CLINIC | Age: 69
End: 2024-04-15
Payer: MEDICARE

## 2024-04-15 NOTE — TELEPHONE ENCOUNTER
----- Message from Kenny Mcallister sent at 4/15/2024  9:40 AM CDT -----  Contact: beatrice  Type: Needs Medical Advice  Who Called:  [facility]   Best Call Back Number:[phone]  Additional Information: Beatrice is calling the office from urgent care pt has 2 fractures in foot, trying to have pt seen today .Please call back and advise.

## 2024-04-16 ENCOUNTER — OFFICE VISIT (OUTPATIENT)
Dept: PODIATRY | Facility: CLINIC | Age: 69
End: 2024-04-16
Payer: MEDICARE

## 2024-04-16 VITALS — HEIGHT: 68 IN | WEIGHT: 198.44 LBS | BODY MASS INDEX: 30.07 KG/M2

## 2024-04-16 DIAGNOSIS — S92.301A CLOSED NONDISPLACED FRACTURE OF METATARSAL BONE OF RIGHT FOOT, UNSPECIFIED METATARSAL, INITIAL ENCOUNTER: Primary | ICD-10-CM

## 2024-04-16 PROCEDURE — 99212 OFFICE O/P EST SF 10 MIN: CPT | Mod: PBBFAC,PO | Performed by: PODIATRIST

## 2024-04-16 PROCEDURE — 99999 PR PBB SHADOW E&M-EST. PATIENT-LVL II: CPT | Mod: PBBFAC,,, | Performed by: PODIATRIST

## 2024-04-16 PROCEDURE — 99213 OFFICE O/P EST LOW 20 MIN: CPT | Mod: 24,S$PBB,, | Performed by: PODIATRIST

## 2024-04-17 ENCOUNTER — PATIENT MESSAGE (OUTPATIENT)
Dept: PODIATRY | Facility: CLINIC | Age: 69
End: 2024-04-17
Payer: MEDICARE

## 2024-04-17 RX ORDER — HYDROCODONE BITARTRATE AND ACETAMINOPHEN 7.5; 325 MG/1; MG/1
1 TABLET ORAL EVERY 8 HOURS PRN
Qty: 21 TABLET | Refills: 0 | Status: SHIPPED | OUTPATIENT
Start: 2024-04-17 | End: 2024-05-23 | Stop reason: SDUPTHER

## 2024-04-23 ENCOUNTER — PATIENT MESSAGE (OUTPATIENT)
Dept: PODIATRY | Facility: CLINIC | Age: 69
End: 2024-04-23
Payer: MEDICARE

## 2024-05-01 ENCOUNTER — HOSPITAL ENCOUNTER (OUTPATIENT)
Dept: RADIOLOGY | Facility: HOSPITAL | Age: 69
Discharge: HOME | End: 2024-05-01
Attending: PODIATRIST
Payer: MEDICARE

## 2024-05-01 DIAGNOSIS — S92.301A CLOSED NONDISPLACED FRACTURE OF METATARSAL BONE OF RIGHT FOOT, UNSPECIFIED METATARSAL, INITIAL ENCOUNTER: ICD-10-CM

## 2024-05-01 PROCEDURE — 73630 X-RAY EXAM OF FOOT: CPT | Mod: TC,FY,PO,RT

## 2024-05-01 PROCEDURE — 73630 X-RAY EXAM OF FOOT: CPT | Mod: 26,RT,, | Performed by: RADIOLOGY

## 2024-05-03 ENCOUNTER — TELEPHONE (OUTPATIENT)
Dept: PODIATRY | Facility: CLINIC | Age: 69
End: 2024-05-03
Payer: MEDICARE

## 2024-05-03 DIAGNOSIS — S92.301A CLOSED NONDISPLACED FRACTURE OF METATARSAL BONE OF RIGHT FOOT, UNSPECIFIED METATARSAL, INITIAL ENCOUNTER: Primary | ICD-10-CM

## 2024-05-03 PROBLEM — F41.9 ANXIETY AND DEPRESSION: Status: RESOLVED | Noted: 2022-07-05 | Resolved: 2024-05-03

## 2024-05-03 PROBLEM — F32.A ANXIETY AND DEPRESSION: Status: RESOLVED | Noted: 2022-07-05 | Resolved: 2024-05-03

## 2024-05-03 NOTE — TELEPHONE ENCOUNTER
----- Message from Emmanuel Liao DPM sent at 5/3/2024  2:09 PM CDT -----  Please let him know that the fracture line is still apparent, but there is no displacement of the fractured bone.  It's still healing, so he will stay in the boot for another two weeks.  Let's schedule him for another x-ray in 2 weeks.  ----- Message -----  From: Interface, Rad Results In  Sent: 5/1/2024   9:30 AM CDT  To: Emmanuel Liao DPM

## 2024-05-15 ENCOUNTER — TELEPHONE (OUTPATIENT)
Dept: PODIATRY | Facility: CLINIC | Age: 69
End: 2024-05-15
Payer: MEDICARE

## 2024-05-15 ENCOUNTER — HOSPITAL ENCOUNTER (OUTPATIENT)
Dept: RADIOLOGY | Facility: HOSPITAL | Age: 69
Discharge: HOME | End: 2024-05-15
Attending: PODIATRIST
Payer: MEDICARE

## 2024-05-15 DIAGNOSIS — S92.301A CLOSED NONDISPLACED FRACTURE OF METATARSAL BONE OF RIGHT FOOT, UNSPECIFIED METATARSAL, INITIAL ENCOUNTER: ICD-10-CM

## 2024-05-15 PROCEDURE — 73630 X-RAY EXAM OF FOOT: CPT | Mod: TC,FY,PO,RT

## 2024-05-15 PROCEDURE — 73630 X-RAY EXAM OF FOOT: CPT | Mod: 26,RT,, | Performed by: RADIOLOGY

## 2024-05-15 NOTE — TELEPHONE ENCOUNTER
Spoke with patient to provide results and scheduled him for a first available appointment for a follow up on 6/6/24 @ 8:40 am. Patient expressed understanding of the message.

## 2024-05-15 NOTE — TELEPHONE ENCOUNTER
----- Message from Emmanuel Liao DPM sent at 5/15/2024  9:39 AM CDT -----  Please let the patient know the fracture site of the 5th metatarsal is still healing.  He needs to stay in the boot another two weeks.  Let's get him a follow up for me to assess.  ----- Message -----  From: Interface, Rad Results In  Sent: 5/15/2024   8:16 AM CDT  To: Emmanuel Liao DPM

## 2024-05-17 ENCOUNTER — TELEPHONE (OUTPATIENT)
Dept: PODIATRY | Facility: CLINIC | Age: 69
End: 2024-05-17
Payer: MEDICARE

## 2024-05-17 ENCOUNTER — PATIENT MESSAGE (OUTPATIENT)
Dept: PODIATRY | Facility: CLINIC | Age: 69
End: 2024-05-17
Payer: MEDICARE

## 2024-05-17 NOTE — TELEPHONE ENCOUNTER
Spoke with the patient , patient was asking if he needed to continue to use the crutches while wearing the boot. I notified him that I could not answer that and that I would ask the doctor and return a call to him on Monday. Patient expressed understanding of the message.

## 2024-05-22 ENCOUNTER — TELEPHONE (OUTPATIENT)
Dept: PODIATRY | Facility: CLINIC | Age: 69
End: 2024-05-22
Payer: MEDICARE

## 2024-05-22 NOTE — TELEPHONE ENCOUNTER
Spoke with the patient to provide doctors' response to his question. Patient expressed understanding of the message.

## 2024-05-28 ENCOUNTER — CLINICAL SUPPORT (OUTPATIENT)
Dept: OPHTHALMOLOGY | Facility: CLINIC | Age: 69
End: 2024-05-28
Payer: MEDICARE

## 2024-05-28 ENCOUNTER — OFFICE VISIT (OUTPATIENT)
Dept: OPTOMETRY | Facility: CLINIC | Age: 69
End: 2024-05-28
Payer: MEDICARE

## 2024-05-28 DIAGNOSIS — H40.1112 PRIMARY OPEN ANGLE GLAUCOMA (POAG) OF RIGHT EYE, MODERATE STAGE: Primary | ICD-10-CM

## 2024-05-28 DIAGNOSIS — H52.203 HYPEROPIA WITH ASTIGMATISM AND PRESBYOPIA, BILATERAL: ICD-10-CM

## 2024-05-28 DIAGNOSIS — H40.1121 PRIMARY OPEN ANGLE GLAUCOMA (POAG) OF LEFT EYE, MILD STAGE: ICD-10-CM

## 2024-05-28 DIAGNOSIS — H52.4 HYPEROPIA WITH ASTIGMATISM AND PRESBYOPIA, BILATERAL: ICD-10-CM

## 2024-05-28 DIAGNOSIS — Z98.890 HISTORY OF REPAIR OF RETINAL TEAR BY LASER PHOTOCOAGULATION: ICD-10-CM

## 2024-05-28 DIAGNOSIS — H52.03 HYPEROPIA WITH ASTIGMATISM AND PRESBYOPIA, BILATERAL: ICD-10-CM

## 2024-05-28 DIAGNOSIS — H25.13 AGE-RELATED NUCLEAR CATARACT, BILATERAL: ICD-10-CM

## 2024-05-28 DIAGNOSIS — H40.1112 PRIMARY OPEN ANGLE GLAUCOMA (POAG) OF RIGHT EYE, MODERATE STAGE: ICD-10-CM

## 2024-05-28 PROCEDURE — 99999 PR PBB SHADOW E&M-EST. PATIENT-LVL III: CPT | Mod: PBBFAC,,,

## 2024-05-28 PROCEDURE — 92133 CPTRZD OPH DX IMG PST SGM ON: CPT | Mod: PBBFAC,PO

## 2024-05-28 PROCEDURE — 92083 EXTENDED VISUAL FIELD XM: CPT | Mod: PBBFAC,PO

## 2024-05-28 PROCEDURE — 99214 OFFICE O/P EST MOD 30 MIN: CPT | Mod: S$PBB,,,

## 2024-05-28 PROCEDURE — 92083 EXTENDED VISUAL FIELD XM: CPT | Mod: 26,S$PBB,,

## 2024-05-28 PROCEDURE — 99213 OFFICE O/P EST LOW 20 MIN: CPT | Mod: PBBFAC,PO,25

## 2024-05-28 NOTE — PROGRESS NOTES
HPI    Pt here for annual eye exam for POAG w/ HVF & OCT    Pt using Latanoprost QHS OU, pt using about 5 x a week, Pt did miss last   night. Pt sts he is happy w/o glasses, wearing +3.00 for reading with   relief. Pt denies flashes/floaters/pain.     Last edited by Lena Franco on 5/28/2024  9:31 AM.            Assessment /Plan     For exam results, see Encounter Report.    Primary open angle glaucoma (POAG) of right eye, moderate stage  -     Posterior Segment OCT Optic Nerve- Both eyes    Primary open angle glaucoma (POAG) of left eye, mild stage  -     Posterior Segment OCT Optic Nerve- Both eyes    Age-related nuclear cataract, bilateral    History of repair of retinal tear by laser photocoagulation    Hyperopia with astigmatism and presbyopia, bilateral      Pt admits to missing Latanoprost 1-2x week. IOP lower today and stable. Emphasized the importance of continued good compliance with drops to prevent worsening visual field loss. RNFL OCT and 24-2 HVF obtained today stable to previous with no significant progression. Monitor in 4 months with IOP check.  IOP   13 // 12 - 05/28/24 16 // 14 - 01/23/24 09 // 09 - 07/28/23 09 // 08 - 04/28/23  Tmax: unknown, pt states it was mid 20s  RNFL OCT  05/28/24  OD: G(62), ST(82), T(57), IT(70), IN(63), N(50), SN(64); ONL  OS: G(76), ST(105), T(58), IT(103), IN(88), N(54), SN(86); Borderline   04/28/23 - Baseline  OD: G(62), ST(82), T(57), IT(70), IN(63), N(50), SN(64); ONL   OS: G(76), ST(105), T(58), IT(103), IN(88), N(54), SN(86); Borderline   24-2 HVF  05/28/24  OD: borderline reliability, GHT: ONL, lens artifact, superior and inferior nasal step   OS: reliable, GHT: ONL, superior and inferior nasal steps / early arcuates  04/28/23 - Baseline  OD: reliable, GHT: ONL, superior arcuate and inferior nasal step OD  OS: reliable, GHT: borderline, early superior and inferior nasal steps OS  Pachs: 489 // 489  (+)Family history, father    3. Mild, not yet visually  significant. Surgery not recommended at this time. Ed pt on symptoms of worsening cataracts including reduced BCVA and glare. Monitor yearly for changes.    4. History of retinal tear with laser repair superior nasal OD. Stable with surrounding pigmentation and drusen. Retinal detachment precautions reviewed with pt. Monitor yearly for changes, sooner prn.     5. Pt happy with uncorrected DVA. Ok to continue with OTC readers as needed for near work. No spec rx given today.      RTC: 4 months for IOP check and gonio

## 2024-05-28 NOTE — PROGRESS NOTES
Pt. Came in today for HVF 24-2 SF, OCT rnfl, and annual eye exam with Dr. Tracy.   Pt. Sat well and was cooperative. During ending of test OD he stated he could not see any lights suddenly. Lights were flashing, but he could not tell. OS went much smoother. HG

## 2024-07-19 PROBLEM — R93.1 ELEVATED CORONARY ARTERY CALCIUM SCORE: Status: ACTIVE | Noted: 2024-07-19

## 2024-07-19 PROBLEM — I1A.0 RESISTANT HYPERTENSION: Status: ACTIVE | Noted: 2024-07-19

## 2024-08-15 PROBLEM — R55 PRE-SYNCOPE: Status: ACTIVE | Noted: 2024-08-15

## 2024-08-27 ENCOUNTER — HOSPITAL ENCOUNTER (OUTPATIENT)
Dept: RADIOLOGY | Facility: HOSPITAL | Age: 69
Discharge: HOME | End: 2024-08-27
Payer: MEDICARE

## 2024-08-27 ENCOUNTER — OFFICE VISIT (OUTPATIENT)
Dept: UROLOGY | Facility: CLINIC | Age: 69
End: 2024-08-27
Payer: MEDICARE

## 2024-08-27 VITALS — WEIGHT: 203.25 LBS | BODY MASS INDEX: 31.9 KG/M2 | HEIGHT: 67 IN

## 2024-08-27 DIAGNOSIS — N50.811 PAIN IN BOTH TESTICLES: Primary | ICD-10-CM

## 2024-08-27 DIAGNOSIS — N50.811 PAIN IN BOTH TESTICLES: ICD-10-CM

## 2024-08-27 DIAGNOSIS — N50.812 PAIN IN BOTH TESTICLES: ICD-10-CM

## 2024-08-27 DIAGNOSIS — N50.812 PAIN IN BOTH TESTICLES: Primary | ICD-10-CM

## 2024-08-27 LAB
BILIRUBIN, UA POC OHS: NEGATIVE
BLOOD, UA POC OHS: NEGATIVE
CLARITY, UA POC OHS: CLEAR
COLOR, UA POC OHS: YELLOW
GLUCOSE, UA POC OHS: NEGATIVE
KETONES, UA POC OHS: NEGATIVE
LEUKOCYTES, UA POC OHS: NEGATIVE
NITRITE, UA POC OHS: NEGATIVE
PH, UA POC OHS: 7
PROTEIN, UA POC OHS: NEGATIVE
SPECIFIC GRAVITY, UA POC OHS: 1.01
UROBILINOGEN, UA POC OHS: 0.2

## 2024-08-27 PROCEDURE — 76870 US EXAM SCROTUM: CPT | Mod: 26,,, | Performed by: RADIOLOGY

## 2024-08-27 PROCEDURE — 81003 URINALYSIS AUTO W/O SCOPE: CPT | Mod: PBBFAC,PO

## 2024-08-27 PROCEDURE — 99213 OFFICE O/P EST LOW 20 MIN: CPT | Mod: S$PBB,,,

## 2024-08-27 PROCEDURE — 99999PBSHW POCT URINALYSIS(INSTRUMENT): Mod: PBBFAC,,,

## 2024-08-27 PROCEDURE — 76870 US EXAM SCROTUM: CPT | Mod: TC,PO

## 2024-08-27 PROCEDURE — 99213 OFFICE O/P EST LOW 20 MIN: CPT | Mod: PBBFAC,PO

## 2024-08-27 PROCEDURE — 99999 PR PBB SHADOW E&M-EST. PATIENT-LVL III: CPT | Mod: PBBFAC,,,

## 2024-08-27 NOTE — PROGRESS NOTES
[facility] Note  Staff: INDIA Ponce    PCP: MD Anton    Chief Complaint: Testicular Pain    Subjective:        HPI: Chau Price is a 68 y.o. male presents today for evaluation of testicular pain. He states this has been happening for the past 2 month intermittently. He denies swelling to the testicles. He states there are times where the testicles are tender to the touch. He denies dysuria, hematuria, fever, flank pain, abd pain, and difficulty urinating.    He does have a significant history of back pain and multiple back surgeries.     Questions asked the pt during ov today:  Urgency: No, urge incontinence? No  Dysuria: No  Gross Hematuria:No  Straining:No, Hesistancy:No, Intermittency:No}, Weak stream:No    Last PSA Screening:   Lab Results   Component Value Date    PSA 0.55 08/12/2024    PSA 0.56 06/01/2023    PSA 0.70 07/01/2022         Constipation issues?:  No      REVIEW OF SYSTEMS:  Review of Systems   Constitutional: Negative.  Negative for chills and fever.   Gastrointestinal: Negative.  Negative for abdominal pain, constipation, diarrhea, nausea and vomiting.   Genitourinary: Negative.  Negative for dysuria, flank pain, frequency, hematuria and urgency.   Musculoskeletal: Negative.  Negative for back pain.       PMHx:  Past Medical History:   Diagnosis Date    Anticoagulant long-term use     Aortic root dilation 04/17/2023 4/23  CT-4.1 cm      Arthritis     Elevated coronary artery calcium score 07/19/2024 4/23  The total coronary calcium score is 1237      2023     Coronary angiography:    Right dominant coronary system   Left main.  Large in caliber, mild calcification, no stenosis   Left anterior descending.  Large in caliber, moderate calcification, mild luminal irregularities.  D1 is a large caliber vessel, mild calcification, mild luminal irregularities.    Ramus:  Medium in caliber, mild calc    Glaucoma     Hypertension     Other ulcerative  colitis with rectal bleeding     Resistant hypertension 07/19/2024    Sleep apnea     USES C-PAP       PSHx:  Past Surgical History:   Procedure Laterality Date    BACK SURGERY  2008 2008-2017; cervical fusion x2 & lumbar fusion x3    INCONTINENCE SURGERY      Urolift    LEFT HEART CATHETERIZATION  9/21/2023    Procedure: Left heart cath;  Surgeon: Lakeisha Mcdaniel MD;  Location: [facility] CATH;  Service: Cardiology;;    REPAIR OF MENISCUS OF KNEE Left 2010    ROTATOR CUFF REPAIR Right 1997    SURGICAL REMOVAL OF BONE SPUR Right 2/28/2024    Procedure: EXCISION, BONE SPUR- 5th metatarsal;  Surgeon: Emmanuel Liao DPM;  Location: [facility] OR;  Service: Podiatry;  Laterality: Right;    TRANSFORAMINAL EPIDURAL INJECTION OF STEROID Left 10/21/2022    Procedure: Injection,steroid,epidural,transforaminal approach L4/5+L5/S1;  Surgeon: Diogo Travis MD;  [facility] OR;  Service: Pain Management;  Laterality: Left;       Fam Hx:   malignancies: Yes - father bladder cancer    kidney stones: No     Soc Hx:  Lives in [place]    Allergies:  Azathioprine, Mercaptopurine, Metoclopramide hcl, and Morphine    Medications: reviewed     Objective:   There were no vitals filed for this visit.    Physical Exam  Constitutional:       Appearance: Normal appearance.   Pulmonary:      Effort: Pulmonary effort is normal.   Abdominal:      General: There is no distension.      Palpations: Abdomen is soft.      Tenderness: There is no abdominal tenderness. There is no right CVA tenderness or left CVA tenderness.   Musculoskeletal:         General: Normal range of motion.      Cervical back: Normal range of motion.   Skin:     General: Skin is warm.   Neurological:      Mental Status: He is alert and oriented to person, place, and time.   Psychiatric:         Mood and Affect: Mood normal.         Behavior: Behavior normal.          EXAM performed by me in office today:  no abnormalities palpated  No scrotal rashes, cysts or  lesions  Epididymis normal in size, no tenderness  Testes normal and size, equal size bilaterally, no masses  Urethral meatus normal without discharge    LABS REVIEW:  UA today:  Color:Clear, Yellow  Spec. Grav.  1.010  PH  7.0  Negative for leukocytes, nitrates, protein, glucose, ketones, urobili, bili, and blood.    Assessment:       1. Pain in both testicles          Plan:     US Scrotum and testicles ordered and scheduled for further evaluation  Recommend supportive underwear, ice/heat, sitz baths, and antiinflammatories as needed    F/u as needed per treatment plan    [facility]: Active    INDIA Ponce

## 2024-08-28 ENCOUNTER — TELEPHONE (OUTPATIENT)
Dept: UROLOGY | Facility: CLINIC | Age: 69
End: 2024-08-28
Payer: MEDICARE

## 2024-08-28 ENCOUNTER — PATIENT MESSAGE (OUTPATIENT)
Dept: UROLOGY | Facility: CLINIC | Age: 69
End: 2024-08-28
Payer: MEDICARE

## 2024-08-28 DIAGNOSIS — N45.1 EPIDIDYMITIS: Primary | ICD-10-CM

## 2024-08-28 RX ORDER — DOXYCYCLINE HYCLATE 100 MG
100 TABLET ORAL 2 TIMES DAILY
Qty: 28 TABLET | Refills: 0 | Status: SHIPPED | OUTPATIENT
Start: 2024-08-28 | End: 2024-09-11

## 2024-08-28 NOTE — TELEPHONE ENCOUNTER
----- Message from Kelli Soot NP sent at 8/28/2024  8:27 AM CDT -----  Mild inflammation/infection seen on US- I sent in doxycycline to be taken for 14 days- continue supportive underwear, ice/heat, and antiinflammatories as needed

## 2024-09-09 ENCOUNTER — OFFICE VISIT (OUTPATIENT)
Dept: URGENT CARE | Facility: CLINIC | Age: 69
End: 2024-09-09
Payer: MEDICARE

## 2024-09-09 VITALS
HEIGHT: 67 IN | HEART RATE: 64 BPM | SYSTOLIC BLOOD PRESSURE: 142 MMHG | TEMPERATURE: 98 F | BODY MASS INDEX: 31.86 KG/M2 | OXYGEN SATURATION: 97 % | DIASTOLIC BLOOD PRESSURE: 89 MMHG | WEIGHT: 203 LBS | RESPIRATION RATE: 18 BRPM

## 2024-09-09 DIAGNOSIS — R05.9 COUGH, UNSPECIFIED TYPE: ICD-10-CM

## 2024-09-09 DIAGNOSIS — J06.9 UPPER RESPIRATORY TRACT INFECTION, UNSPECIFIED TYPE: Primary | ICD-10-CM

## 2024-09-09 DIAGNOSIS — J30.9 CHRONIC ALLERGIC RHINITIS: ICD-10-CM

## 2024-09-09 LAB
CTP QC/QA: YES
SARS-COV-2 AG RESP QL IA.RAPID: NEGATIVE

## 2024-09-09 PROCEDURE — 87811 SARS-COV-2 COVID19 W/OPTIC: CPT | Mod: QW,S$GLB,, | Performed by: PHYSICIAN ASSISTANT

## 2024-09-09 PROCEDURE — 99203 OFFICE O/P NEW LOW 30 MIN: CPT | Mod: S$GLB,,, | Performed by: PHYSICIAN ASSISTANT

## 2024-09-09 RX ORDER — FAMOTIDINE 20 MG/1
20 TABLET, FILM COATED ORAL 2 TIMES DAILY
Qty: 20 TABLET | Refills: 0 | Status: SHIPPED | OUTPATIENT
Start: 2024-09-09 | End: 2024-09-19

## 2024-09-09 RX ORDER — PREDNISONE 10 MG/1
TABLET ORAL
Qty: 11 TABLET | Refills: 0 | Status: SHIPPED | OUTPATIENT
Start: 2024-09-09

## 2024-09-09 RX ORDER — PROMETHAZINE HYDROCHLORIDE 6.25 MG/5ML
SYRUP ORAL
Qty: 120 ML | Refills: 1 | Status: SHIPPED | OUTPATIENT
Start: 2024-09-09

## 2024-09-09 NOTE — PATIENT INSTRUCTIONS
OVER THE COUNTER RECOMMENDATIONS/SUGGESTIONS.     Make sure to stay well hydrated.     Use Nasal Saline to mechanically move any post nasal drip from your eustachian tube or from the back of your throat.     Use warm salt water gargles to ease your throat pain. Warm salt water gargles as needed for sore throat-  1/2 tsp salt to 1 cup warm water, gargle as desired.     Use an antihistamine such as Claritin, Zyrtec or Allegra to dry you out.      Use pseudoephedrine (behind the counter) to decongest. Pseudoephedrine  30 mg up to 240 mg /day. It can raise your blood pressure and give you palpitations.     Use mucinex (guaifenisin) to break up mucous up to 2400mg/day to loosen any mucous.   The mucinex DM pill has a cough suppressant that can be sedating. It can be used at night to stop the tickle at the back of your throat.  You can use Mucinex D (it has guaifenesin and a high dose of pseudoephedrine) in the mornings to help decongest.        Use Flonase 1-2 sprays/nostril per day. It is a local acting steroid nasal spray, if you develop a bloody nose, stop using the medication immediately.     Sometimes Nyquil at night is beneficial to help you get some rest, however it is sedating and it does have an antihistamine, and tylenol.     Honey is a natural cough suppressant that can be used.     Tylenol up to 4,000 mg a day is safe for short periods and can be used for body aches, pain, and fever. However in high doses and prolonged use it can cause liver irritation.     Ibuprofen is a non-steroidal anti-inflammatory that can be used for body aches, pain, and fever.However it can also cause stomach irritation if over used.     INSTRUCTIONS:  - Rest.  - Drink plenty of fluids.  - Take Tylenol and/or Ibuprofen as directed as needed for fever/pain.  Do not take more than the recommended dose.  - follow up with your PCP within the next 1-2 weeks as needed.  - You must understand that you have received an Urgent Care treatment  only and that you may be released before all of your medical problems are known or treated.   - You, the patient, will arrange for follow up care as instructed.   - If your condition worsens or fails to improve we recommend that you receive another evaluation at the ER immediately or contact your PCP to discuss your concerns.   - You can call [phone] or [phone] to help schedule an appointment with the appropriate provider.     -If you smoke cigarettes, it would be beneficial for you to stop.

## 2024-09-09 NOTE — PROGRESS NOTES
"Subjective:      Patient ID: Chau Price is a 68 y.o. male.    Vitals:  height is 5' 7" (1.702 m) and weight is 92.1 kg (203 lb). His oral temperature is 97.8 °F (36.6 °C). His blood pressure is 142/89 (abnormal) and his pulse is 64. His respiration is 18 and oxygen saturation is 97%.     Chief Complaint: Sinus Problem    Pt presents with c/o cough, sneezing, runny nose, body aches X 3 days. Pt states has taken antihistamines, no relief. Pain score 0/10    Sinus Problem  This is a new problem. The current episode started in the past 7 days. The problem is unchanged. There has been no fever. The fever has been present for Less than 1 day. Associated symptoms include congestion, coughing, headaches and sneezing. Pertinent negatives include no chills, diaphoresis, ear pain, hoarse voice, neck pain, shortness of breath, sinus pressure, sore throat or swollen glands. Past treatments include oral decongestants. The treatment provided no relief.       Constitution: Positive for fatigue. Negative for chills, sweating and fever.   HENT:  Positive for congestion and postnasal drip. Negative for ear pain, drooling, sinus pressure, sore throat, trouble swallowing and voice change.    Neck: Negative for neck pain, neck stiffness and painful lymph nodes.   Cardiovascular:  Negative for chest pain, leg swelling, palpitations, sob on exertion and passing out.   Eyes:  Negative for eye discharge, eye itching, eye pain, eye redness and eyelid swelling.   Respiratory:  Positive for cough. Negative for chest tightness, sputum production, bloody sputum, shortness of breath, stridor and wheezing.    Gastrointestinal:  Negative for abdominal pain, abdominal bloating, nausea, vomiting, constipation, diarrhea and heartburn.   Genitourinary:  Negative for urine decreased.   Musculoskeletal:  Negative for joint pain, joint swelling, abnormal ROM of joint, pain with walking, muscle cramps and muscle ache.   Skin:  Negative for rash " and hives.   Allergic/Immunologic: Positive for sneezing. Negative for hives and itching.   Neurological:  Positive for headaches. Negative for dizziness, light-headedness, passing out, loss of balance, altered mental status, loss of consciousness, numbness and seizures.   Hematologic/Lymphatic: Negative for swollen lymph nodes.   Psychiatric/Behavioral:  Negative for altered mental status and nervous/anxious. The patient is not nervous/anxious.       Objective:     Physical Exam   Constitutional: He is oriented to person, place, and time. He appears well-developed. He is cooperative.  Non-toxic appearance. He does not appear ill. No distress.   HENT:   Head: Normocephalic and atraumatic.   Ears:   Right Ear: Hearing, tympanic membrane, external ear and ear canal normal.   Left Ear: Hearing, tympanic membrane, external ear and ear canal normal.   Nose: Mucosal edema and rhinorrhea present. No nasal deformity. No epistaxis. Right sinus exhibits no maxillary sinus tenderness and no frontal sinus tenderness. Left sinus exhibits no maxillary sinus tenderness and no frontal sinus tenderness.   Mouth/Throat: Uvula is midline and mucous membranes are normal. No trismus in the jaw. Normal dentition. No uvula swelling. Posterior oropharyngeal erythema and cobblestoning present. No oropharyngeal exudate, posterior oropharyngeal edema or tonsillar abscesses. No tonsillar exudate.   Eyes: Conjunctivae and lids are normal. No scleral icterus.   Neck: Trachea normal and phonation normal. Neck supple. No edema present. No erythema present. No neck rigidity present.   Cardiovascular: Normal rate, regular rhythm, normal heart sounds and normal pulses.   Pulmonary/Chest: Effort normal and breath sounds normal. No accessory muscle usage or stridor. No respiratory distress. He has no decreased breath sounds. He has no wheezes. He has no rhonchi. He has no rales.   Abdominal: Normal appearance.   Musculoskeletal: Normal range of motion.          General: No deformity. Normal range of motion.   Lymphadenopathy:     He has no cervical adenopathy.   Neurological: He is alert and oriented to person, place, and time. He has normal sensation. He exhibits normal muscle tone. Gait normal. Coordination normal.   Skin: Skin is warm, dry, intact, not diaphoretic, not pale and no rash. Capillary refill takes less than 2 seconds.   Psychiatric: His speech is normal and behavior is normal. Judgment and thought content normal.   Nursing note and vitals reviewed.      Assessment:     1. Upper respiratory tract infection, unspecified type    2. Cough, unspecified type    3. Chronic allergic rhinitis        Plan:       Upper respiratory tract infection, unspecified type    Cough, unspecified type  -     SARS Coronavirus 2 Antigen, POCT Manual Read    Chronic allergic rhinitis    Other orders  -     promethazine (PHENERGAN) 6.25 mg/5 mL syrup; Take 10 mLs at night as needed.  Dispense: 120 mL; Refill: 1  -     famotidine (PEPCID) 20 MG tablet; Take 1 tablet (20 mg total) by mouth 2 (two) times daily. for 10 days  Dispense: 20 tablet; Refill: 0  -     predniSONE (DELTASONE) 10 MG tablet; Take 40mg for 1 days, take 30mg for 1 days, take 20mg for 1 days, take 10mg for 2 days  Dispense: 11 tablet; Refill: 0      Patient Instructions   OVER THE COUNTER RECOMMENDATIONS/SUGGESTIONS.     Make sure to stay well hydrated.     Use Nasal Saline to mechanically move any post nasal drip from your eustachian tube or from the back of your throat.     Use warm salt water gargles to ease your throat pain. Warm salt water gargles as needed for sore throat-  1/2 tsp salt to 1 cup warm water, gargle as desired.     Use an antihistamine such as Claritin, Zyrtec or Allegra to dry you out.      Use pseudoephedrine (behind the counter) to decongest. Pseudoephedrine  30 mg up to 240 mg /day. It can raise your blood pressure and give you palpitations.     Use mucinex (guaifenisin) to break up  mucous up to 2400mg/day to loosen any mucous.   The mucinex DM pill has a cough suppressant that can be sedating. It can be used at night to stop the tickle at the back of your throat.  You can use Mucinex D (it has guaifenesin and a high dose of pseudoephedrine) in the mornings to help decongest.        Use Flonase 1-2 sprays/nostril per day. It is a local acting steroid nasal spray, if you develop a bloody nose, stop using the medication immediately.     Sometimes Nyquil at night is beneficial to help you get some rest, however it is sedating and it does have an antihistamine, and tylenol.     Honey is a natural cough suppressant that can be used.     Tylenol up to 4,000 mg a day is safe for short periods and can be used for body aches, pain, and fever. However in high doses and prolonged use it can cause liver irritation.     Ibuprofen is a non-steroidal anti-inflammatory that can be used for body aches, pain, and fever.However it can also cause stomach irritation if over used.     INSTRUCTIONS:  - Rest.  - Drink plenty of fluids.  - Take Tylenol and/or Ibuprofen as directed as needed for fever/pain.  Do not take more than the recommended dose.  - follow up with your PCP within the next 1-2 weeks as needed.  - You must understand that you have received an Urgent Care treatment only and that you may be released before all of your medical problems are known or treated.   - You, the patient, will arrange for follow up care as instructed.   - If your condition worsens or fails to improve we recommend that you receive another evaluation at the ER immediately or contact your PCP to discuss your concerns.   - You can call [phone] or [phone] to help schedule an appointment with the appropriate provider.     -If you smoke cigarettes, it would be beneficial for you to stop.

## 2024-09-28 ENCOUNTER — PATIENT MESSAGE (OUTPATIENT)
Dept: UROLOGY | Facility: CLINIC | Age: 69
End: 2024-09-28
Payer: MEDICARE

## 2024-09-28 DIAGNOSIS — N40.1 BENIGN PROSTATIC HYPERPLASIA WITH NOCTURIA: Primary | ICD-10-CM

## 2024-09-28 DIAGNOSIS — R35.1 BENIGN PROSTATIC HYPERPLASIA WITH NOCTURIA: Primary | ICD-10-CM

## 2024-10-01 ENCOUNTER — TELEPHONE (OUTPATIENT)
Dept: PODIATRY | Facility: CLINIC | Age: 69
End: 2024-10-01
Payer: MEDICARE

## 2024-10-01 NOTE — TELEPHONE ENCOUNTER
Spoke with the patient to reschedule appointment per provider's request. First available was scheduled for 10/30/24 @ 2:00 pm. Patient expressed understanding of the message.

## 2024-10-03 RX ORDER — TAMSULOSIN HYDROCHLORIDE 0.4 MG/1
0.4 CAPSULE ORAL DAILY
Qty: 90 CAPSULE | Refills: 3 | Status: SHIPPED | OUTPATIENT
Start: 2024-10-03 | End: 2025-10-03

## 2024-10-31 ENCOUNTER — TELEPHONE (OUTPATIENT)
Dept: UROLOGY | Facility: CLINIC | Age: 69
End: 2024-10-31
Payer: MEDICARE

## 2024-11-04 ENCOUNTER — TELEPHONE (OUTPATIENT)
Dept: UROLOGY | Facility: CLINIC | Age: 69
End: 2024-11-04
Payer: MEDICARE

## 2024-11-04 NOTE — TELEPHONE ENCOUNTER
----- Message from Kiersten Berrios sent at 11/4/2024 11:26 AM CST -----  Regarding: FW: Needs return call    ----- Message -----  From: Nora Mohan  Sent: 11/4/2024  11:17 AM CST  To: Sarah ROMAN Staff  Subject: Needs return call                                Type: Needs Medical Advice  Who Called:  Sanaz Che Physical Therapist     Best Call Back Number: [phone] Fax:[phone]  Additional Information: Pt is coming as new patient, she is worried regarding urinary retention, wants to request a urodynmic study done for him, please call to domenic

## 2024-11-04 NOTE — TELEPHONE ENCOUNTER
Spoke with PT bonny regarding pt needing to est care with urologist at [facility] for urodynamics study. She says she will speak with pt regarding that issue also she wants pt to keep scheduled appt with NP to discuss urinary retention and to have bladder scanned. Wants to discuss with NP how to manage retention, etc

## 2024-11-04 NOTE — TELEPHONE ENCOUNTER
Spoke with PT bonny that states pt/ family is requesting 2nd opinion from M.D. requesting appt with Dr. Smith. Will forward message to Dr Smith and Staff.

## 2024-11-05 ENCOUNTER — OFFICE VISIT (OUTPATIENT)
Dept: UROLOGY | Facility: CLINIC | Age: 69
End: 2024-11-05
Payer: MEDICARE

## 2024-11-05 DIAGNOSIS — N40.1 BENIGN PROSTATIC HYPERPLASIA WITH NOCTURIA: ICD-10-CM

## 2024-11-05 DIAGNOSIS — R35.1 BENIGN PROSTATIC HYPERPLASIA WITH NOCTURIA: ICD-10-CM

## 2024-11-05 DIAGNOSIS — R33.9 URINARY RETENTION: ICD-10-CM

## 2024-11-05 DIAGNOSIS — R39.14 FEELING OF INCOMPLETE BLADDER EMPTYING: Primary | ICD-10-CM

## 2024-11-05 LAB
BILIRUBIN, UA POC OHS: NEGATIVE
BLOOD, UA POC OHS: NEGATIVE
CLARITY, UA POC OHS: CLEAR
COLOR, UA POC OHS: YELLOW
GLUCOSE, UA POC OHS: NEGATIVE
KETONES, UA POC OHS: NEGATIVE
LEUKOCYTES, UA POC OHS: NEGATIVE
NITRITE, UA POC OHS: NEGATIVE
PH, UA POC OHS: 7
POC RESIDUAL URINE VOLUME: 194 ML (ref 0–100)
PROTEIN, UA POC OHS: NEGATIVE
SPECIFIC GRAVITY, UA POC OHS: 1.01
UROBILINOGEN, UA POC OHS: 0.2

## 2024-11-05 PROCEDURE — 99999PBSHW POCT URINALYSIS(INSTRUMENT): Mod: PBBFAC,,,

## 2024-11-05 PROCEDURE — 99999PBSHW POCT BLADDER SCAN: Mod: PBBFAC,,,

## 2024-11-05 PROCEDURE — 99213 OFFICE O/P EST LOW 20 MIN: CPT | Mod: PBBFAC,PO | Performed by: NURSE PRACTITIONER

## 2024-11-05 PROCEDURE — 51701 INSERT BLADDER CATHETER: CPT | Mod: S$PBB,,, | Performed by: NURSE PRACTITIONER

## 2024-11-05 PROCEDURE — 81003 URINALYSIS AUTO W/O SCOPE: CPT | Mod: PBBFAC,PO | Performed by: NURSE PRACTITIONER

## 2024-11-05 PROCEDURE — 51701 INSERT BLADDER CATHETER: CPT | Mod: PBBFAC,PO | Performed by: NURSE PRACTITIONER

## 2024-11-05 PROCEDURE — 99999 PR PBB SHADOW E&M-EST. PATIENT-LVL III: CPT | Mod: PBBFAC,,, | Performed by: NURSE PRACTITIONER

## 2024-11-05 PROCEDURE — 99214 OFFICE O/P EST MOD 30 MIN: CPT | Mod: S$PBB,25,, | Performed by: NURSE PRACTITIONER

## 2024-11-05 PROCEDURE — 51798 US URINE CAPACITY MEASURE: CPT | Mod: PBBFAC,PO | Performed by: NURSE PRACTITIONER

## 2024-11-05 RX ORDER — TAMSULOSIN HYDROCHLORIDE 0.4 MG/1
0.8 CAPSULE ORAL DAILY
Qty: 180 CAPSULE | Refills: 2 | Status: SHIPPED | OUTPATIENT
Start: 2024-11-05 | End: 2025-02-03

## 2024-11-05 NOTE — PROGRESS NOTES
"[facility] Note  Staff: INDIA Montilla    PCP: RHONDA Walton    Chief Complaint: BPH s/p Urolift-wants to establish in [place]    Subjective:        HPI: Chau Price is a 69 y.o. male NEW PT to our [place] clinic presents today for 2nd opinion related to his  past with BPH s/p Urolift.    OV 11/5/24:  NEW PATIENT to our [place] [facility] presents today for a "2nd Opinion" for evaluation of BPH with LUTS. He has a history of a Urolift done in [place] at least 3 years ago or longer. He stated he initially had great response but is now experiencing a weak and slower urinary stream, nocturia, and an intermittent stream. He states prior to the urolift he was taking tamsulosin, finasteride, and cialis for BPH. He is currently not taking any bladder or prostate medications. He denies dysuria, hematuria, urgency, frequency, incontinence, and difficulty urinating. He denies a history of kidney stones. His most recent PSA from 7/1/22 was 0.70. He denies a family history of prostate cancer. He does have a family history of bladder cancer.     Cystoscopy procedure was performed by Dr. Smith on 3/30/23.  The flexible cystoscope was placed into the urethra and carefully advanced into the bladder.  A careful cystoscopic exam was then performed.  The entire bladder mucosa was systematically visualized.  Findings include moderate bladder wall trabeculation.  There were no lesions, masses foreign bodies or stones.   Each ureteral orifices were visualized and both had clear efflux of urine.  On retroflexion there was a small intravesical gland.  On each side of the bladder neck there was invagination of the mucosa and I suspect the bladder neck implants were placed ectopically into the bladder.  There was no overlying calcification or inflammation.  The cystoscope was then removed and I examined the entire length of the urethra.  The bladder neck appeared opened although somewhat CT I in " appearance.  There was mild bilobar enlargement of the prostate otherwise the urethra appeared normal.  He tolerated the procedure well.  There were no complications     Impression after Cysto per MD:  Prostate is not overly enlarged.  I do not feel that there is an obstructive component remaining in the prostate.  I suspect the bladder neck implants were placed ectopically although I do not feel that this is causing clinical symptoms.     OV 24:  UA upon arrival showed WNL  PVR is 194 mL  Pt does not feel that he is fully emptying his bladder with urination.  He has started sitting on the toilet to urinate and sometimes he has to wait several minutes to start the flow of urine.  He recently restarted Flomax 0.4 mg daily a few weeks ago which he Is unable to tell if this is improving his LUTS at this time.  No dysuria  No gross hematuria noted by pt today.    AUA SS Today:  35  Feeling of ICBE: 5  Frequency:5  Intermittency:5  Urgency:5  Weak urine stream:5  Strainin  Nocturia:5    PSA Lab Results:  24: 0.55  23:  0.56  22:  0.70    REVIEW OF SYSTEMS:  A comprehensive 10 system review was performed and is negative except as noted above in HPI    PMHx:  Past Medical History:   Diagnosis Date    Anticoagulant long-term use     Aortic root dilation 2023  CT-4.1 cm      Arthritis     Elevated coronary artery calcium score 2024  The total coronary calcium score is 1237           Coronary angiography:    Right dominant coronary system   Left main.  Large in caliber, mild calcification, no stenosis   Left anterior descending.  Large in caliber, moderate calcification, mild luminal irregularities.  D1 is a large caliber vessel, mild calcification, mild luminal irregularities.    Ramus:  Medium in caliber, mild calc    Glaucoma     Hypertension     Other ulcerative colitis with rectal bleeding     Resistant hypertension 2024    Sleep apnea     USES C-PAP  "      PSHx:  Past Surgical History:   Procedure Laterality Date    BACK SURGERY  2008 2008-2017; cervical fusion x2 & lumbar fusion x3    INCONTINENCE SURGERY      Urolift    LEFT HEART CATHETERIZATION  9/21/2023    Procedure: Left heart cath;  Surgeon: Lakeisha Mcdaniel MD;  Location: [facility] CATH;  Service: Cardiology;;    REPAIR OF MENISCUS OF KNEE Left 2010    ROTATOR CUFF REPAIR Right 1997    SURGICAL REMOVAL OF BONE SPUR Right 2/28/2024    Procedure: EXCISION, BONE SPUR- 5th metatarsal;  Surgeon: Emmanuel Liao DPM;  Location: [facility] OR;  Service: Podiatry;  Laterality: Right;    TRANSFORAMINAL EPIDURAL INJECTION OF STEROID Left 10/21/2022    Procedure: Injection,steroid,epidural,transforaminal approach L4/5+L5/S1;  Surgeon: Diogo Travis MD;  Location: [facility] OR;  Service: Pain Management;  Laterality: Left;       Allergies:  Azathioprine, Mercaptopurine, Metoclopramide hcl, and Morphine    Medications: reviewed     Objective:   There were no vitals filed for this visit.    General:WDWN in NAD  Eyes: PERRLA, normal conjunctiva  Respiratory: no increased work on breathing, clear to auscultation  Cardiovascular: regular rate and rhythm. No obvious extremity edema.  GI: palpation of masses. No tenderness. No hepatosplenomegaly to palpation.  Musculoskeletal: normal range of motion of bilateral upper extremities. Normal muscle strength and tone.  Skin: no obvious rashes or lesions. No tightening of skin noted.  Neurologic: CN grossly normal. Normal sensation.   Psychiatric: awake, alert and oriented x 3. Mood and affect normal. Cooperative.    Procedure Note performed by me during OV today to check for possible stricture or other unknown concern:  After betadine irrigation of the urethra, lidocaine instilled via urojet by me. A #16 Japanese red rubber "coude" tip catheter was inserted into the bladder with 400 mL of urine obtained.  It was found during cath process--I did have some mild difficulty advancing " "catheter through prostate and into the bladder to obtain the urine at this time.  However, Pt tolerated procedure well and in and out cath was removed.    Assessment:       1. Feeling of incomplete bladder emptying    2. Benign prostatic hyperplasia with nocturia    3. Urinary retention          Plan:   IC   Instructed pt to increase Flomax to 0.8 mg daily at this time in the evening at least 2-3 hrs prior to bedtime at this time.  Continue to sit on toilet for all urination/toileting at this time.    "START TIMED VOIDING/BLADDER TRAINING" ASAP!!:  WHETHER YOU FEEL AN URGE TO URINATE OR NOT, YOU SHOULD BE FREQUENTING THE TOILET AND ATTEMPT TO URINATE EVERY 3 HOURS!!  NEVER POSTPONE URINATING OR HOLD YOUR URINE.    MAKE SURE YOU ARE HAVING REGULAR/NORMAL BOWEL MOVEMENTS AS THIS ALSO WILL HAVE AN EFFECT ON HOW YOUR BLADDER FUNCTIONS.    NOTHING TO EAT OR DRINK BY MOUTH (THIS INCLUDES WATER) AT LEAST 1-2 HOURS PRIOR TO YOUR BEDTIME ROUTINE.    F/u with established Urologist in [place] to discuss further options for treatment concerning his LUTS--repeat cysto to check for stricture vs. Pt requesting information on aquablation in the future.      Valeria Li, FNP-C  Visit today is associated with current or anticipated ongoing medical care related to this patient's single serious condition/complex condition.      "

## 2024-11-11 ENCOUNTER — TELEPHONE (OUTPATIENT)
Dept: PAIN MEDICINE | Facility: CLINIC | Age: 69
End: 2024-11-11
Payer: MEDICARE

## 2024-11-11 NOTE — TELEPHONE ENCOUNTER
Physician - Dr Travis    Type of Procedure/Injection - Lumbar Medial Branch Block  L3/4 and L5/S1           Laterality - Bilateral      Priority - Normal      Anxiolysis- RNIV      Need to hold medication - No      N/A    None      Clearance needed - No      Follow up - phone call next day    Please let the patient know that his Dr. Adam Saxena sent us a request to get him set up for this procedure.  We can schedule him or if he has questions we can make an appointment to discuss

## 2024-11-12 ENCOUNTER — OFFICE VISIT (OUTPATIENT)
Dept: OPTOMETRY | Facility: CLINIC | Age: 69
End: 2024-11-12
Payer: MEDICARE

## 2024-11-12 DIAGNOSIS — H40.1112 PRIMARY OPEN ANGLE GLAUCOMA (POAG) OF RIGHT EYE, MODERATE STAGE: Primary | ICD-10-CM

## 2024-11-12 DIAGNOSIS — H40.1121 PRIMARY OPEN ANGLE GLAUCOMA (POAG) OF LEFT EYE, MILD STAGE: ICD-10-CM

## 2024-11-12 PROCEDURE — 92020 GONIOSCOPY: CPT | Mod: PBBFAC,PO

## 2024-11-12 PROCEDURE — 99999 PR PBB SHADOW E&M-EST. PATIENT-LVL III: CPT | Mod: PBBFAC,,,

## 2024-11-12 PROCEDURE — 99213 OFFICE O/P EST LOW 20 MIN: CPT | Mod: PBBFAC,PO

## 2024-11-12 NOTE — PROGRESS NOTES
HPI    4 month IOP & gonio for POAG    Pt using Latanoprost QHS OU, pt using about 5-7 x a week, last used   yesterday. Pt denies any new changes since last visit.     Last edited by Lena Franco on 11/12/2024  2:02 PM.            Assessment /Plan     For exam results, see Encounter Report.    Primary open angle glaucoma (POAG) of right eye, moderate stage  -     Patino Visual Field - OU - Extended - Both Eyes; Future; Expected date: 05/12/2025    Primary open angle glaucoma (POAG) of left eye, mild stage  -     Patino Visual Field - OU - Extended - Both Eyes; Future; Expected date: 05/12/2025      Pt presented for 4 month IOP check. IOP low and stable on Latanoprost QHS OU. Emphasized the importance of continued good compliance with drops to prevent worsening visual field loss. Pt to return in 6 months for annual exam with RNFL OCT and 24-2 HVF.  IOP   10 // 11 - 11/12/24 13 // 12 - 05/28/24 16 // 14 - 01/23/24 09 // 09 - 07/28/23 09 // 08 - 04/28/23  Tmax: unknown, pt states it was mid 20s  RNFL OCT  05/28/24  OD: G(62), ST(82), T(57), IT(70), IN(63), N(50), SN(64); ONL  OS: G(76), ST(105), T(58), IT(103), IN(88), N(54), SN(86); Borderline   04/28/23 - Baseline  OD: G(62), ST(82), T(57), IT(70), IN(63), N(50), SN(64); ONL   OS: G(76), ST(105), T(58), IT(103), IN(88), N(54), SN(86); Borderline   24-2 HVF  05/28/24  OD: borderline reliability, GHT: ONL, lens artifact, superior and inferior nasal step   OS: reliable, GHT: ONL, superior and inferior nasal steps / early arcuates  04/28/23 - Baseline  OD: reliable, GHT: ONL, superior arcuate and inferior nasal step OD  OS: reliable, GHT: borderline, early superior and inferior nasal steps OS  Pachs: 489 // 489  Gonio: open to SS or  OD, OS.  (+)Family history, father    RTC: 6 months for annual glaucoma exam with RNFL OCT and 242- HVF.

## 2024-11-13 DIAGNOSIS — M47.816 LUMBAR SPONDYLOSIS: Primary | ICD-10-CM

## 2024-11-13 RX ORDER — SODIUM CHLORIDE, SODIUM LACTATE, POTASSIUM CHLORIDE, CALCIUM CHLORIDE 600; 310; 30; 20 MG/100ML; MG/100ML; MG/100ML; MG/100ML
INJECTION, SOLUTION INTRAVENOUS CONTINUOUS
OUTPATIENT
Start: 2024-11-13

## 2024-11-13 NOTE — TELEPHONE ENCOUNTER
Spoke with pt, explained basics of the type of procedure that was requested, scheduled procedure with Dr. Travis. Pain diary sent for day of procedure.

## 2024-12-04 ENCOUNTER — PATIENT MESSAGE (OUTPATIENT)
Dept: ADMINISTRATIVE | Facility: OTHER | Age: 69
End: 2024-12-04
Payer: MEDICARE

## 2024-12-05 ENCOUNTER — HOSPITAL ENCOUNTER (OUTPATIENT)
Dept: RADIOLOGY | Facility: HOSPITAL | Age: 69
Discharge: HOME | End: 2024-12-05
Attending: ANESTHESIOLOGY | Admitting: ANESTHESIOLOGY
Payer: MEDICARE

## 2024-12-05 ENCOUNTER — HOSPITAL ENCOUNTER (OUTPATIENT)
Facility: HOSPITAL | Age: 69
Discharge: HOME | End: 2024-12-05
Attending: ANESTHESIOLOGY | Admitting: ANESTHESIOLOGY
Payer: MEDICARE

## 2024-12-05 VITALS
WEIGHT: 203 LBS | OXYGEN SATURATION: 99 % | BODY MASS INDEX: 31.86 KG/M2 | RESPIRATION RATE: 16 BRPM | HEIGHT: 67 IN | SYSTOLIC BLOOD PRESSURE: 140 MMHG | DIASTOLIC BLOOD PRESSURE: 75 MMHG | TEMPERATURE: 98 F | HEART RATE: 62 BPM

## 2024-12-05 DIAGNOSIS — M47.816 LUMBAR SPONDYLOSIS: ICD-10-CM

## 2024-12-05 DIAGNOSIS — M54.50 LOWER BACK PAIN: ICD-10-CM

## 2024-12-05 PROCEDURE — 64494 INJ PARAVERT F JNT L/S 2 LEV: CPT | Mod: 50,KX,, | Performed by: ANESTHESIOLOGY

## 2024-12-05 PROCEDURE — 63600175 PHARM REV CODE 636 W HCPCS: Mod: PO | Performed by: ANESTHESIOLOGY

## 2024-12-05 PROCEDURE — 64493 INJ PARAVERT F JNT L/S 1 LEV: CPT | Mod: 50,KX,, | Performed by: ANESTHESIOLOGY

## 2024-12-05 PROCEDURE — 64493 INJ PARAVERT F JNT L/S 1 LEV: CPT | Mod: 50,PO | Performed by: ANESTHESIOLOGY

## 2024-12-05 PROCEDURE — 64494 INJ PARAVERT F JNT L/S 2 LEV: CPT | Mod: 50,PO | Performed by: ANESTHESIOLOGY

## 2024-12-05 RX ORDER — SODIUM CHLORIDE, SODIUM LACTATE, POTASSIUM CHLORIDE, CALCIUM CHLORIDE 600; 310; 30; 20 MG/100ML; MG/100ML; MG/100ML; MG/100ML
INJECTION, SOLUTION INTRAVENOUS CONTINUOUS
Status: DISCONTINUED | OUTPATIENT
Start: 2024-12-05 | End: 2024-12-05 | Stop reason: HOSPADM

## 2024-12-05 RX ORDER — MIDAZOLAM HYDROCHLORIDE 1 MG/ML
INJECTION INTRAMUSCULAR; INTRAVENOUS
Status: DISCONTINUED | OUTPATIENT
Start: 2024-12-05 | End: 2024-12-05 | Stop reason: HOSPADM

## 2024-12-05 RX ORDER — LIDOCAINE HYDROCHLORIDE 10 MG/ML
INJECTION, SOLUTION EPIDURAL; INFILTRATION; INTRACAUDAL; PERINEURAL
Status: DISCONTINUED | OUTPATIENT
Start: 2024-12-05 | End: 2024-12-05 | Stop reason: HOSPADM

## 2024-12-05 RX ORDER — BUPIVACAINE HYDROCHLORIDE 2.5 MG/ML
INJECTION, SOLUTION EPIDURAL; INFILTRATION; INTRACAUDAL
Status: DISCONTINUED | OUTPATIENT
Start: 2024-12-05 | End: 2024-12-05 | Stop reason: HOSPADM

## 2024-12-05 NOTE — H&P
CC: Back pain    HPI: The patient is a 68yo man with a history of lumbar spondylosis here for bilateral L3/4 and L5/S1 MBB. There are no major changes in history and physical from 3/29/23 but recently seen by Dr. Adam Saxena and sent for the procedure.    Past Medical History:   Diagnosis Date    Anticoagulant long-term use     Aortic root dilation 04/17/2023 4/23  CT-4.1 cm      Arthritis     Elevated coronary artery calcium score 07/19/2024 4/23  The total coronary calcium score is 1237      2023     Coronary angiography:    Right dominant coronary system   Left main.  Large in caliber, mild calcification, no stenosis   Left anterior descending.  Large in caliber, moderate calcification, mild luminal irregularities.  D1 is a large caliber vessel, mild calcification, mild luminal irregularities.    Ramus:  Medium in caliber, mild calc    Glaucoma     Hypertension     Other ulcerative colitis with rectal bleeding     Resistant hypertension 07/19/2024    Sleep apnea     USES C-PAP       Past Surgical History:   Procedure Laterality Date    BACK SURGERY  2008 2008-2017; cervical fusion x2 & lumbar fusion x3    INCONTINENCE SURGERY      Urolift    LEFT HEART CATHETERIZATION  9/21/2023    Procedure: Left heart cath;  Surgeon: Lakeisha Mcdaniel MD;  Location: [facility] CATH;  Service: Cardiology;;    REPAIR OF MENISCUS OF KNEE Left 2010    ROTATOR CUFF REPAIR Right 1997    SURGICAL REMOVAL OF BONE SPUR Right 2/28/2024    Procedure: EXCISION, BONE SPUR- 5th metatarsal;  Surgeon: Emmanuel Liao DPM;  Location: [facility] OR;  Service: Podiatry;  Laterality: Right;    TRANSFORAMINAL EPIDURAL INJECTION OF STEROID Left 10/21/2022    Procedure: Injection,steroid,epidural,transforaminal approach L4/5+L5/S1;  Surgeon: Diogo Travis MD;  Location: [facility] OR;  Service: Pain Management;  Laterality: Left;       Family History   Problem Relation Name Age of Onset    Cancer Mother          lung    Glaucoma Father      Cancer  "Father          bladder    Macular degeneration Neg Hx         Social History     Socioeconomic History    Marital status:    Tobacco Use    Smoking status: Former     Types: Cigars    Smokeless tobacco: Never   Substance and Sexual Activity    Alcohol use: Not Currently     Comment: social     Social Drivers of Health     Financial Resource Strain: Low Risk  (8/27/2024)    Overall Financial Resource Strain (CARDIA)     Difficulty of Paying Living Expenses: Not very hard   Food Insecurity: No Food Insecurity (8/27/2024)    Hunger Vital Sign     Worried About Running Out of Food in the Last Year: Never true     Ran Out of Food in the Last Year: Never true   Transportation Needs: No Transportation Needs (5/1/2024)    PRAPARE - Transportation     Lack of Transportation (Medical): No     Lack of Transportation (Non-Medical): No   Physical Activity: Sufficiently Active (8/27/2024)    Exercise Vital Sign     Days of Exercise per Week: 7 days     Minutes of Exercise per Session: 60 min   Stress: No Stress Concern Present (8/27/2024)    [employer] - Occupational Stress Questionnaire     Feeling of Stress : Only a little   Housing Stability: Unknown (8/27/2024)    Housing Stability Vital Sign     Unable to Pay for Housing in the Last Year: No       No current facility-administered medications for this encounter.       Review of patient's allergies indicates:   Allergen Reactions    Azathioprine Other (See Comments)     Megacolon, "colon almost burst"  Other reaction(s): Not available    Mercaptopurine Other (See Comments)     Megacolon, "colon almost burst"    Metoclopramide hcl     Morphine        Vitals:    11/27/24 0817 12/05/24 1315   BP:  (!) 146/82   Pulse:  61   Resp:  15   Temp:  97 °F (36.1 °C)   TempSrc:  Skin   SpO2:  97%   Weight: 92.1 kg (203 lb) 92.1 kg (203 lb)   Height: 5' 7" (1.702 m) 5' 7" (1.702 m)       ASA 2, Mallampati 2    REVIEW OF SYSTEMS:     GENERAL: No weight " loss, malaise or fevers.  HEENT:  No recent changes in vision or hearing  NECK: Negative for lumps, no difficulty with swallowing.  RESPIRATORY: Negative for cough, wheezing or shortness of breath, patient denies any recent URI.  CARDIOVASCULAR: Negative for chest pain, leg swelling or palpitations.  GI: Negative for abdominal discomfort, blood in stools or black stools or change in bowel habits.  MUSCULOSKELETAL: See HPI.  SKIN: Negative for lesions, rash, and itching.  PSYCH: No suicidal or homicidal ideations, no current mood disturbances.  HEMATOLOGY/LYMPHOLOGY: Negative for prolonged bleeding, bruising easily or swollen nodes. Patient is not currently taking any anti-coagulants  ENDO: No history of diabetes or thyroid dysfunction  NEURO: No history of syncope, paralysis, seizures or tremors.All other reviewed and negative other than HPI.    Physical exam:  Gen: A and O x3, pleasant, well-groomed  Skin: No rashes or obvious lesions  HEENT: PERRLA, no obvious deformities on ears or in canals. No thyroid masses, trachea midline, no palpable lymph nodes in neck, axilla.  CVS: Regular rate and rhythm, normal S1 and S2, no murmurs.  Resp: Clear to auscultation bilaterally.  Abdomen: Soft, NT/ND, normal bowel sounds present.  Musculoskeletal/Neuro: Moving all extremities    Assessment:  Lumbar spondylosis  -     Place in Outpatient; Standing  -     Vital signs; Standing  -     Place 18-22 gauage peripheral IV ; Standing  -     Verify informed consent; Standing  -     Notify physician ; Standing  -     Notify physician ; Standing  -     Notify physician (specify); Standing  -     Diet NPO; Standing  -     lactated ringers infusion    Other orders  -     IP VTE LOW RISK PATIENT; Standing

## 2024-12-05 NOTE — DISCHARGE SUMMARY
Con - Surgery  Discharge Note  Short Stay    Procedure(s) (LRB):  Block-nerve-medial branch-lumbar Bilat L3/4 and L5/S1 (Bilateral)      OUTCOME: Patient tolerated treatment/procedure well without complication and is now ready for discharge.    DISPOSITION: Home or Self Care    FINAL DIAGNOSIS:  Lumbar radiculopathy    FOLLOWUP: In clinic    DISCHARGE INSTRUCTIONS:    Discharge Procedure Orders   Diet Adult Regular     No dressing needed     Notify your health care provider if you experience any of the following:  temperature >100.4     Activity as tolerated

## 2024-12-05 NOTE — OP NOTE
PROCEDURE DATE: 12/5/2024    PROCEDURE:  Diagnostic bilateral L3/4 and L5/S1 medial branch nerve block     DIAGNOSIS:  Lumbar spondylosis    Post Op diagnosis: Same    PHYSICIAN: Diogo Travis MD    MEDICATIONS INJECTED: 0.25% bupivicaine, 1ml at each level    LOCAL ANESTHETIC USED: Lidocaine 1%, 2ml at each level    SEDATION MEDICATIONS:2mg versed    ESTIMATED BLOOD LOSS:  none    COMPLICATIONS:  none    TECHNIQUE: A time out was taken to identify the patient, procedure and side of the procedure. The patient was placed in a prone position, then prepped and draped in the usual sterile fashion using ChloraPrep and sterile towels.  The levels were determined under fluoroscopic guidance and then marked.  Local anesthetic was given by raising a wheal at the skin over each site and then infiltrated approximately 2cm deeper.  A 25-gauge 3.5 inch needle was introduced to the anatomic location of the right and then left L3/4 and L5/S1 medial branch nerves on the bilateral side.  The above medication was then injected. The patient tolerated the procedure well.     The patient was monitored after the procedure. The patient will be contacted in the next few days to determine extent of relief.  Patient was given post procedure and discharge instructions to follow at home.  The patient was discharged in a stable condition.

## 2024-12-06 ENCOUNTER — PATIENT MESSAGE (OUTPATIENT)
Dept: PAIN MEDICINE | Facility: CLINIC | Age: 69
End: 2024-12-06
Payer: MEDICARE

## 2024-12-12 ENCOUNTER — OFFICE VISIT (OUTPATIENT)
Dept: PAIN MEDICINE | Facility: CLINIC | Age: 69
End: 2024-12-12
Payer: MEDICARE

## 2024-12-12 VITALS
BODY MASS INDEX: 32.7 KG/M2 | DIASTOLIC BLOOD PRESSURE: 71 MMHG | SYSTOLIC BLOOD PRESSURE: 130 MMHG | HEART RATE: 63 BPM | WEIGHT: 208.75 LBS

## 2024-12-12 DIAGNOSIS — M54.16 LUMBAR RADICULOPATHY: Primary | ICD-10-CM

## 2024-12-12 DIAGNOSIS — Z98.1 HISTORY OF LUMBAR SPINAL FUSION: ICD-10-CM

## 2024-12-12 DIAGNOSIS — Z98.1 HISTORY OF FUSION OF CERVICAL SPINE: ICD-10-CM

## 2024-12-12 DIAGNOSIS — G89.4 CHRONIC PAIN DISORDER: ICD-10-CM

## 2024-12-12 DIAGNOSIS — M47.816 LUMBAR SPONDYLOSIS: ICD-10-CM

## 2024-12-12 PROCEDURE — 99213 OFFICE O/P EST LOW 20 MIN: CPT | Mod: PBBFAC,PO

## 2024-12-12 PROCEDURE — 99999 PR PBB SHADOW E&M-EST. PATIENT-LVL III: CPT | Mod: PBBFAC,,,

## 2024-12-12 RX ORDER — GABAPENTIN 600 MG/1
600 TABLET ORAL 2 TIMES DAILY
Qty: 60 TABLET | Refills: 1 | Status: SHIPPED | OUTPATIENT
Start: 2024-12-12 | End: 2025-01-11

## 2024-12-12 NOTE — PROGRESS NOTES
This note was completed with dictation software and grammatical errors may exist.    Chief Complaint   Patient presents with    Back Pain        HPI: Chau Price is a 69 y.o. year old male patient who has a past medical history of Anticoagulant long-term use, Aortic root dilation, Arthritis, Elevated coronary artery calcium score, Glaucoma, Hypertension, Other ulcerative colitis with rectal bleeding, Resistant hypertension, and Sleep apnea. He presents in referral from No ref. provider found for back and leg pain.  He returns for follow-up with continued lower back pain, 4-7/10, across his lower back with radiation down legs, worse at night when trying to sleep.  He was previously taking Lyrica 75 mg b.i.d. but discontinued as it was not providing relief.  He also discontinued amitriptyline as it was also not providing relief.  He is currently only taking naproxen, pain medication as needed for severe pain however no significant relief.      Prior HPI: Chau Price is a 67 y.o. year old male patient who has a past medical history of Other ulcerative colitis with rectal bleeding. He presents in referral from No ref. provider found for back and leg pain.  The patient reports having left leg pain for the last 4 years, has undergone several surgeries and actually had undergone surgery at L4/5 about 1.5 years ago after which he reports his back pain is better but his left leg pain  And numbness still continues.  Feels that he actually has some slight weakness in his left foot.  He has been in physical therapy over the last year and continues to do exercises on his own.  Describes his pain as numb, sharp, shooting.  It starts at the hip and radiates to the ankle.  Is worse with lying down, he actually does better with standing and with some medications.  He denies any bowel or bladder incontinence.    Pain intervention history:  Had undergone some injections prior to his surgery.  He is status post left  L4/5 and L5/S1 transforaminal [place] on 11/21/2022 with no significant relief.     Spine surgeries:  Cervical surgery x2, lumbar surgery x3, the last was about 1.5 years ago.    Antineuropathics:  Has failed gabapentin and Lyrica in the past  NSAIDs:  Physical therapy:  Antidepressants:  Muscle relaxers:  Celexa 20 mg, BuSpar 10 mg, trazodone 50 mg  Opioids:  Antiplatelets/Anticoagulants:        ROS:  He reports joint stiffness, difficulty sleeping.  Balance of review of systems is negative.    Lab Results   Component Value Date    HGBA1C 5.0 06/01/2023       Lab Results   Component Value Date    WBC 5.19 08/04/2024    HGB 12.5 (L) 08/04/2024    HCT 36.3 (L) 08/04/2024    MCV 91 08/04/2024     08/04/2024             Past Medical History:   Diagnosis Date    Anticoagulant long-term use     Aortic root dilation 04/17/2023 4/23  CT-4.1 cm      Arthritis     Elevated coronary artery calcium score 07/19/2024 4/23  The total coronary calcium score is 1237      2023     Coronary angiography:    Right dominant coronary system   Left main.  Large in caliber, mild calcification, no stenosis   Left anterior descending.  Large in caliber, moderate calcification, mild luminal irregularities.  D1 is a large caliber vessel, mild calcification, mild luminal irregularities.    Ramus:  Medium in caliber, mild calc    Glaucoma     Hypertension     Other ulcerative colitis with rectal bleeding     Resistant hypertension 07/19/2024    Sleep apnea     USES C-PAP       Past Surgical History:   Procedure Laterality Date    BACK SURGERY  2008 2008-2017; cervical fusion x2 & lumbar fusion x3    INCONTINENCE SURGERY      Urolift    INJECTION OF ANESTHETIC AGENT AROUND MEDIAL BRANCH NERVES INNERVATING LUMBAR FACET JOINT Bilateral 12/5/2024    Procedure: Block-nerve-medial branch-lumbar Bilat L3/4 and L5/S1;  Surgeon: Diogo Travis MD;  Location: [facility] OR;  Service: Pain Management;  Laterality: Bilateral;  Bilat L3/4 and  L5/S1 priority normal    LEFT HEART CATHETERIZATION  9/21/2023    Procedure: Left heart cath;  Surgeon: Lakeisha Mcdaniel MD;  Location: [facility] CATH;  Service: Cardiology;;    REPAIR OF MENISCUS OF KNEE Left 2010    ROTATOR CUFF REPAIR Right 1997    SURGICAL REMOVAL OF BONE SPUR Right 2/28/2024    Procedure: EXCISION, BONE SPUR- 5th metatarsal;  Surgeon: Emmanuel Liao DPM;  Location: [facility] OR;  Service: Podiatry;  Laterality: Right;    TRANSFORAMINAL EPIDURAL INJECTION OF STEROID Left 10/21/2022    Procedure: Injection,steroid,epidural,transforaminal approach L4/5+L5/S1;  Surgeon: Diogo Travis MD;  Location: [facility] OR;  Service: Pain Management;  Laterality: Left;       Social History     Socioeconomic History    Marital status:    Tobacco Use    Smoking status: Former     Types: Cigars    Smokeless tobacco: Never   Substance and Sexual Activity    Alcohol use: Not Currently     Comment: social     Social Drivers of Health     Financial Resource Strain: Low Risk  (8/27/2024)    Overall Financial Resource Strain (CARDIA)     Difficulty of Paying Living Expenses: Not very hard   Food Insecurity: No Food Insecurity (8/27/2024)    Hunger Vital Sign     Worried About Running Out of Food in the Last Year: Never true     Ran Out of Food in the Last Year: Never true   Transportation Needs: No Transportation Needs (5/1/2024)    PRAPARE - Transportation     Lack of Transportation (Medical): No     Lack of Transportation (Non-Medical): No   Physical Activity: Sufficiently Active (8/27/2024)    Exercise Vital Sign     Days of Exercise per Week: 7 days     Minutes of Exercise per Session: 60 min   Stress: No Stress Concern Present (8/27/2024)    [employer] - Occupational Stress Questionnaire     Feeling of Stress : Only a little   Housing Stability: Unknown (8/27/2024)    Housing Stability Vital Sign     Unable to Pay for Housing in the Last Year: No         Medications/Allergies: See  med card    Vitals:    24 1442   BP: 130/71   Pulse: 63   Weight: 94.7 kg (208 lb 12.4 oz)   PainSc:   4   PainLoc: Back     Body mass index is 32.7 kg/m².    Physical exam:    Gen: A and O x3, pleasant, well-groomed  Skin: No rashes or obvious lesions  HEENT: PERRLA, no obvious deformities on ears or in canals. Trachea midline.  CVS: Regular rate and rhythm, normal palpable pulses.  Resp:No increased work of breathing, symmetrical chest rise.  Abdomen: Soft, NT/ND.  Musculoskeletal: No antalgic gait.  Ambulates with cane mainly for support from feeling unsteady due to numbness in left leg    Neuro:  Gen: A and O x3, pleasant, well-groomed  Skin: No rashes or obvious lesions  HEENT: PERRLA, no obvious deformities on ears or in canals.Trachea midline.  CVS: Regular rate and rhythm, normal palpable pulses.  Resp: Clear to auscultation bilaterally, no wheezes or rales.  Abdomen: Soft, NT/ND.  Musculoskeletal: Able to heel walk, toe walk. No antalgic gait.       Neuro:  Motor:        Iliopsoas Quadriceps Knee  Flexion Tibialis  anterior Gastro- cnemius EHL   Lower: R 5/5 5/5 5/5 5/5 5/5 5/5    L 5/5 5/5 5/5 5/5 5/5 4/5      Left  Right    Patellar DTR 1+ 1+   Achilles DTR 0+ 1+   Triceps DTR 1+ 1+   Biceps DTR 2+ 2+   Brachioradialis DTR 2+ 2+     Intact and symmetrical to light touch and pinprick in L1-S1 dermatomes bilaterally.    Lumbar spine:  Lumbar spine: ROM is   Moderately reduced with flexion extension and oblique extension with no increased pain.    Olaf's test causes no increased pain on either side.    Supine straight leg raise is  positive for worsening left leg pain.  Internal and external rotation of the hip causes no increased pain on either side.  Myofascial exam: No tenderness to palpation across lumbar paraspinous muscles.    Imagin22 MRI L-spine:  There is a minimal retrolisthesis of L3 on L4.  There are no acute fractures.  There is a mild chronic compression of the superior  endplate of L3.   There are postoperative changes from anterior interbody lumbar fusion L4-5 disc space.  There is presence of a spacer in a satisfactory position.  Decompressive laminectomies with resection of the spinous process of L4 and L5.   The tip of the conus medullaris is at L1 level.  Mild dextroscoliosis of the mid to upper lumbar spine.  Paraspinal soft tissues are unremarkable.   L5-S1: Disc degeneration with disc space narrowing.  There is a broad-based posterior osteophyte and disc complex greater on the right compared to the left with mild compression of thecal sac greater on the right.  No significant central canal spinal stenosis.  There is facet arthropathy decompressive laminectomy noted.  No abnormal intradural enhancement.  The in the far lateral right neural foramen there is an osteophyte and a disc bulge complex that contacts the inferior surface of the exiting right L5 root (image 10 series 3).  Clinical correlation for right L5 radiculopathy suggested.  Left neural foramen is unremarkable.   L4-5: Postoperative changes from anterior interbody lumbar fusion.  No recurrent disc herniations or central canal spinal stenosis.  There is enhancing scar the in the right lateral recess.  Stable postoperative changes from decompressive laminectomy.  Degenerative facet arthropathy bilaterally.   L3-4: Approximately 2 mm retrolisthesis of L3 on L4.  Disc space narrowing associated with disc degeneration.  There is a broad-based central and left paracentral disc protrusion/herniation resulting in left lateral recess stenosis (image 28 series 12.  There is also at least a moderate left L3-4 foraminal stenosis.  Compression of the exiting left L3 root in the neural foramen is not excluded.  Right neural foramen is unremarkable.  There is mild facet arthropathy.   L2-3: Disc degeneration with circumferential annular disc bulge.  No significant disc herniations or significant central canal spinal stenosis.   There is facet arthropathy.   L1-2: There is a mild left paracentral disc bulge/protrusion.  No significant central canal spinal stenosis.  There is facet arthropathy.  Neural foramina are unremarkable.   T12-L1: Disc degeneration with disc space narrowing.  In addition to the circumferential mild annular disc bulge there is a left paracentral disc protrusion/disc herniation resulting in at least a mild left lateral recess stenosis (image 4 series 12).  Mild bilateral foraminal stenosis.     5/17/22 Xray L-spine:  There are no prior studies for comparison at this time.  There are postoperative changes from anterior interbody lumbar fusion.  Hardware in satisfactory position.  No subluxations is noted.   On flexion and extension radiographs there is no significant instability at all intervertebral disc spaces.   here is mild depression of the superior endplate of L3, likely from a chronic mild compression fracture.  There is degenerative disc disease also at the L2-3 disc space and L3-4 and L5-S1 disc spaces.  Mild marginal anterior spondylotic osteophytes throughout the lumbar spine.   There is calcific atherosclerotic changes of the abdominal aorta.  There is also disc degeneration at the T11-T12 disc space associated with circumferential mild annular disc bulge    10/03/2024 MRI L-spine  T12-L1: Trace retrolisthesis of T12 on L1.  Mild disc bulge.  Mild narrowing of the bilateral lateral recesses, unchanged.  No significant spinal canal stenosis.  Mild bilateral foraminal stenosis, unchanged.  L1-L2: Mild disc bulge.  Mild narrowing of the bilateral lateral recesses, unchanged.  No significant spinal canal stenosis.  Mild bilateral foraminal stenosis, unchanged.  L2-L3: Mild disc bulge. Mild right and minimal left facet hypertrophy. Ligamentum flavum thickening.  Mild narrowing of the bilateral lateral recesses, unchanged.  No significant spinal canal stenosis.  Mild right and minimal left foraminal stenosis,  unchanged.  L3-L4: Retrolisthesis.  Mild disc bulge.  Mild bilateral facet hypertrophy.  Ligamentum flavum thickening.  Mild-moderate narrowing of the bilateral lateral recesses, slightly progressed on the right.  No significant spinal canal stenosis.  Mild-moderate left and mild right foraminal stenosis, unchanged.  L4-L5: Fused.  Laminectomy.  No significant spinal canal stenosis.  Mild-moderate left and mild right foraminal stenosis, unchanged.  L5-S1: Laminectomy.  Trace retrolisthesis.  Mild disc bulge, eccentric to the right.  Mild-moderate narrowing the right lateral recess.  No significant spinal canal stenosis.  Mild-moderate right foraminal stenosis, unchanged.    Assessment:  Chau Price is a 66 y.o. year old male patient who has a past medical history of Other ulcerative colitis with rectal bleeding. He presents in referral from Dr. René Christine for back and leg pain.    1. Lumbar radiculopathy  gabapentin (NEURONTIN) 600 MG tablet      2. Lumbar spondylosis        3. History of lumbar spinal fusion        4. History of fusion of cervical spine        5. Chronic pain disorder                Plan:  We reviewed his updated lumbar MRI together in office today and is consistent with  L4-5 anterior fusion with L4 and L5 laminectomies.L3-4 mild-moderate narrowing of the lateral recesses bilaterally. Multilevel foraminal stenosis, greatest on the left at L3-4 and L4-5 and on the right at L5-S1 where it is mild-moderate.  We have discussed in the past that he has had multiple lumbar epidurals without significant relief.  Discussed next interventional treatment option would be spinal cord stimulator.  He is still considering.  He did not find relief with medicinal marijuana  In the interim, we will trial gabapentin 600 mg nightly for the 1st week and if tolerating increase to twice daily thereafter.  He has taken this in the past for previous foot pain with benefit no ill side effects.  He will follow  up as needed, if he is finding improvement with the gabapentin can either increase or consider adding Cymbalta.        Thank you for referring this interesting patient, and I look forward to continuing to collaborate in his care.

## 2025-01-06 ENCOUNTER — PATIENT MESSAGE (OUTPATIENT)
Dept: PAIN MEDICINE | Facility: CLINIC | Age: 70
End: 2025-01-06
Payer: MEDICARE

## 2025-01-06 ENCOUNTER — TELEPHONE (OUTPATIENT)
Dept: PAIN MEDICINE | Facility: CLINIC | Age: 70
End: 2025-01-06
Payer: MEDICARE

## 2025-01-16 ENCOUNTER — PATIENT MESSAGE (OUTPATIENT)
Dept: PAIN MEDICINE | Facility: CLINIC | Age: 70
End: 2025-01-16
Payer: MEDICARE

## 2025-02-10 ENCOUNTER — OFFICE VISIT (OUTPATIENT)
Dept: UROLOGY | Facility: CLINIC | Age: 70
End: 2025-02-10
Payer: MEDICARE

## 2025-02-10 VITALS — HEIGHT: 67 IN | BODY MASS INDEX: 33.49 KG/M2 | WEIGHT: 213.38 LBS

## 2025-02-10 DIAGNOSIS — R39.12 BENIGN PROSTATIC HYPERPLASIA WITH WEAK URINARY STREAM: Primary | ICD-10-CM

## 2025-02-10 DIAGNOSIS — N40.1 BENIGN PROSTATIC HYPERPLASIA WITH WEAK URINARY STREAM: Primary | ICD-10-CM

## 2025-02-10 LAB
BILIRUBIN, UA POC OHS: NEGATIVE
BLOOD, UA POC OHS: NEGATIVE
CLARITY, UA POC OHS: CLEAR
COLOR, UA POC OHS: YELLOW
GLUCOSE, UA POC OHS: NEGATIVE
KETONES, UA POC OHS: NEGATIVE
LEUKOCYTES, UA POC OHS: NEGATIVE
NITRITE, UA POC OHS: NEGATIVE
PH, UA POC OHS: 7
POC RESIDUAL URINE VOLUME: 84 ML (ref 0–100)
PROTEIN, UA POC OHS: NEGATIVE
SPECIFIC GRAVITY, UA POC OHS: 1.01
UROBILINOGEN, UA POC OHS: 0.2

## 2025-02-10 PROCEDURE — 99999PBSHW POCT BLADDER SCAN: Mod: PBBFAC,,,

## 2025-02-10 PROCEDURE — 51798 US URINE CAPACITY MEASURE: CPT | Mod: PBBFAC,PO

## 2025-02-10 PROCEDURE — 99999 PR PBB SHADOW E&M-EST. PATIENT-LVL III: CPT | Mod: PBBFAC,,,

## 2025-02-10 PROCEDURE — 99999PBSHW POCT URINALYSIS(INSTRUMENT): Mod: PBBFAC,,,

## 2025-02-10 PROCEDURE — 99999PBSHW PR PBB SHADOW TECHNICAL ONLY FILED TO HB: Mod: PBBFAC,,,

## 2025-02-10 PROCEDURE — 81003 URINALYSIS AUTO W/O SCOPE: CPT | Mod: PBBFAC,PO

## 2025-02-10 PROCEDURE — 99214 OFFICE O/P EST MOD 30 MIN: CPT | Mod: S$PBB,,,

## 2025-02-10 PROCEDURE — 99213 OFFICE O/P EST LOW 20 MIN: CPT | Mod: PBBFAC,PO

## 2025-02-10 RX ORDER — TAMSULOSIN HYDROCHLORIDE 0.4 MG/1
0.8 CAPSULE ORAL DAILY
Qty: 180 CAPSULE | Refills: 3 | Status: SHIPPED | OUTPATIENT
Start: 2025-02-10 | End: 2026-02-10

## 2025-02-10 NOTE — PROGRESS NOTES
[facility] Note  Staff: INDIA Ponce    PCP: MD Anton    Chief Complaint: LUTS    Subjective:        HPI: Chau Price is a 69 y.o. male presents today for follow up LUTS. This is not a new problem for him. He has a history of Urolift done in [place] about 5 years ago. He is currently taking flomax 0.8mg daily. He complains of a slower and weaker urinary stream. He also states he wakes up 1-2 times during the night. He denies dysuria, hematuria, and difficulty urinating. He currently has to sit down to urinate.     Questions asked the pt during ov today:  Urgency: yes, urge incontinence? yes  NTF: 1-2x night  Dysuria: No  Gross Hematuria:No  Straining:Yes , Hesistancy:Yes , Intermittency:Yes }, Weak stream:Yes     Last PSA Screening:   Lab Results   Component Value Date    PSA 0.55 2024    PSA 0.56 2023    PSA 0.70 2022         Constipation issues?:  no    AUA SS Today:  32  Feeling of ICBE:  5  Frequency:  5  Intermittency:  5  Urgency:  5  Weak urine stream:  5  Strainin  Nocturia:  2    PVR by bladder scan performed by MA today:  84 mL    REVIEW OF SYSTEMS:  Review of Systems   Constitutional: Negative.  Negative for chills and fever.   Gastrointestinal: Negative.  Negative for abdominal pain, constipation, diarrhea, nausea and vomiting.   Genitourinary:  Positive for urgency. Negative for dysuria, flank pain, frequency and hematuria.   Musculoskeletal: Negative.  Negative for back pain.       PMHx:  Past Medical History:   Diagnosis Date    Anticoagulant long-term use     Aortic root dilation 2023  CT-4.1 cm      Arthritis     Elevated coronary artery calcium score 2024  The total coronary calcium score is 1237           Coronary angiography:    Right dominant coronary system   Left main.  Large in caliber, mild calcification, no stenosis   Left anterior descending.  Large in caliber, moderate calcification, mild  luminal irregularities.  D1 is a large caliber vessel, mild calcification, mild luminal irregularities.    Ramus:  Medium in caliber, mild calc    Glaucoma     Hypertension     Other ulcerative colitis with rectal bleeding     Resistant hypertension 07/19/2024    Sleep apnea     USES C-PAP       PSHx:  Past Surgical History:   Procedure Laterality Date    BACK SURGERY  2008 2008-2017; cervical fusion x2 & lumbar fusion x3    INCONTINENCE SURGERY      Urolift    INJECTION OF ANESTHETIC AGENT AROUND MEDIAL BRANCH NERVES INNERVATING LUMBAR FACET JOINT Bilateral 12/5/2024    Procedure: Block-nerve-medial branch-lumbar Bilat L3/4 and L5/S1;  Surgeon: Diogo Travis MD;  Location: [facility] OR;  Service: Pain Management;  Laterality: Bilateral;  Bilat L3/4 and L5/S1 priority normal    LEFT HEART CATHETERIZATION  9/21/2023    Procedure: Left heart cath;  Surgeon: Lakeisha Mcdaniel MD;  Location: [facility] CATH;  Service: Cardiology;;    REPAIR OF MENISCUS OF KNEE Left 2010    ROTATOR CUFF REPAIR Right 1997    SURGICAL REMOVAL OF BONE SPUR Right 2/28/2024    Procedure: EXCISION, BONE SPUR- 5th metatarsal;  Surgeon: Emmanuel Liao DPM;  Location: [facility] OR;  Service: Podiatry;  Laterality: Right;    TRANSFORAMINAL EPIDURAL INJECTION OF STEROID Left 10/21/2022    Procedure: Injection,steroid,epidural,transforaminal approach L4/5+L5/S1;  Surgeon: Diogo Travis MD;  Location: [facility] OR;  Service: Pain Management;  Laterality: Left;       Fam Hx:   malignancies: Yes - father bladder cancer    kidney stones: No     Soc Hx:  Lives in [place]    Allergies:  Azathioprine, Mercaptopurine, Metoclopramide hcl, and Morphine    Medications: reviewed     Objective:   There were no vitals filed for this visit.    Physical Exam  Constitutional:       Appearance: Normal appearance.   Pulmonary:      Effort: Pulmonary effort is normal.   Abdominal:      General: There is no distension.      Palpations: Abdomen is soft.       Tenderness: There is no abdominal tenderness.   Musculoskeletal:         General: Normal range of motion.      Cervical back: Normal range of motion.   Skin:     General: Skin is warm.   Neurological:      Mental Status: He is oriented to person, place, and time.   Psychiatric:         Mood and Affect: Mood normal.         Behavior: Behavior normal.         LABS REVIEW:  UA today:  Color:Clear, Yellow  Spec. Grav.  1.010  PH  7.0  Negative for leukocytes, nitrates, protein, glucose, ketones, urobili, bili, and blood.    Assessment:       1. Benign prostatic hyperplasia with weak urinary stream          Plan:     Continue flomax 0.8mg daily as prescribed  Pelvis US ordered and scheduled to estimate prostate size  Uroflow on nurse schedule ordered and scheduled  Pt to f/u with Dr. Martinez to discuss all findings and treatment options    F/u per plan    [facility]: clare Soto, SABRINA-C

## 2025-02-13 ENCOUNTER — HOSPITAL ENCOUNTER (OUTPATIENT)
Dept: RADIOLOGY | Facility: HOSPITAL | Age: 70
Discharge: HOME | End: 2025-02-13
Payer: MEDICARE

## 2025-02-13 ENCOUNTER — CLINICAL SUPPORT (OUTPATIENT)
Dept: UROLOGY | Facility: CLINIC | Age: 70
End: 2025-02-13
Payer: MEDICARE

## 2025-02-13 DIAGNOSIS — N40.1 BENIGN PROSTATIC HYPERPLASIA WITH WEAK URINARY STREAM: Primary | ICD-10-CM

## 2025-02-13 DIAGNOSIS — R39.12 BENIGN PROSTATIC HYPERPLASIA WITH WEAK URINARY STREAM: Primary | ICD-10-CM

## 2025-02-13 DIAGNOSIS — N40.1 BENIGN PROSTATIC HYPERPLASIA WITH WEAK URINARY STREAM: ICD-10-CM

## 2025-02-13 DIAGNOSIS — R39.12 BENIGN PROSTATIC HYPERPLASIA WITH WEAK URINARY STREAM: ICD-10-CM

## 2025-02-13 PROBLEM — Z79.899 ENCOUNTER FOR LONG-TERM (CURRENT) USE OF MEDICATIONS: Status: RESOLVED | Noted: 2022-07-05 | Resolved: 2025-02-13

## 2025-02-13 PROCEDURE — 99213 OFFICE O/P EST LOW 20 MIN: CPT | Mod: PBBFAC,25,PO

## 2025-02-13 PROCEDURE — 76857 US EXAM PELVIC LIMITED: CPT | Mod: 26,,, | Performed by: RADIOLOGY

## 2025-02-13 PROCEDURE — 76857 US EXAM PELVIC LIMITED: CPT | Mod: TC,PO

## 2025-02-13 PROCEDURE — 99999 PR PBB SHADOW E&M-EST. PATIENT-LVL III: CPT | Mod: PBBFAC,,,

## 2025-02-13 NOTE — PROGRESS NOTES
Patient in clinic for Uroflow. Post void PVR displays 72 cc. Notified Dr. Martinez. Pt will follow up @ office visit later this week.   Uroflow -    Voiding time: 91.6 s  Flow rate: 88 s  Time to peakflow: 11.2 s  Peak flowrate: 9.7 ml/s  Average flowrate: 4.6 ml/s  Intervals: 4  Voided volume: 404 ml

## 2025-02-17 ENCOUNTER — OFFICE VISIT (OUTPATIENT)
Dept: UROLOGY | Facility: CLINIC | Age: 70
End: 2025-02-17
Payer: MEDICARE

## 2025-02-17 VITALS — WEIGHT: 216.06 LBS | HEIGHT: 68 IN | BODY MASS INDEX: 32.74 KG/M2

## 2025-02-17 DIAGNOSIS — N13.8 BPH WITH URINARY OBSTRUCTION: Primary | ICD-10-CM

## 2025-02-17 DIAGNOSIS — N40.1 BPH WITH URINARY OBSTRUCTION: Primary | ICD-10-CM

## 2025-02-17 DIAGNOSIS — Z98.890 HISTORY OF UROLOGIC SURGERY: ICD-10-CM

## 2025-02-17 PROBLEM — Z12.5 SCREENING PSA (PROSTATE SPECIFIC ANTIGEN): Status: RESOLVED | Noted: 2022-07-05 | Resolved: 2025-02-17

## 2025-02-17 PROCEDURE — 99213 OFFICE O/P EST LOW 20 MIN: CPT | Mod: PBBFAC,PO | Performed by: STUDENT IN AN ORGANIZED HEALTH CARE EDUCATION/TRAINING PROGRAM

## 2025-02-17 NOTE — PROGRESS NOTES
"[place]   Clinic Note    Subjective:     Chief Complaint: Consult      History of Present Illness    CHIEF COMPLAINT:  Chau presents today with complaints of weak urinary stream and prolonged urination.    URINARY SYMPTOMS:  He reports weak urinary stream with prolonged urination and dribbling. Generic Flomax has helped improve nighttime urinary frequency. Uroflow test showed a peak flow rate less than 10 mL/second, suggestive of urinary obstruction. He underwent a UroLift procedure in [place] in 2019.     Uroflow 2/13/2025:   Voiding time: 91.6 s  Flow rate: 88 s  Time to peakflow: 11.2 s  Peak flowrate: 9.7 ml/s  Average flowrate: 4.6 ml/s  Intervals: 4  Voided volume: 404 ml   PVR: 72 mL     PVR   Latest Ref Rng 0 - 100 mL   1/24/2023 29    11/5/2024 194 !    2/10/2025 84         IPSS Questionnaire (AUA-SS):  1) Incomplete Emptying 5 - Almost always   2) Frequency 5 - Almost always   3) Intermittency 5 - Almost always   4) Urgency 5 - Almost always   5) Weak Stream  5 - Almost always   6) Straining 5 - Almost always   7) Nocturia 2 - 2 times   Total score:   32             Past medical, family, surgical and social history reviewed as documented in chart with pertinent positive medical, family, surgical and social history detailed in HPI.    A review systems was conducted with pertinent positive and negative findings documented in HPI.    Objective:     Estimated body mass index is 32.85 kg/m² as calculated from the following:    Height as of this encounter: 5' 8" (1.727 m).    Weight as of this encounter: 98 kg (216 lb 0.8 oz).    Vital Signs (Most Recent)       General: No acute distress, well developed.   Head: Normocephalic, atraumatic  CV: no cyanosis  Lungs: normal respiratory effort, no respiratory distress    Labs reviewed below:  Lab Results   Component Value Date    BUN 14 01/02/2025    CREATININE 0.86 01/02/2025    WBC 4.58 01/02/2025    HGB 13.0 (L) 01/02/2025    HCT 38.6 (L) 01/02/2025    "  (L) 01/02/2025    AST 27 01/02/2025    ALT 22 01/02/2025    ALKPHOS 64 01/02/2025    ALBUMIN 4.2 01/02/2025    HGBA1C 5.0 06/01/2023        PSA trend reviewed below:  Lab Results   Component Value Date    PSA 0.55 08/12/2024    PSA 0.56 06/01/2023    PSA 0.70 07/01/2022      Assessment:     1. BPH with urinary obstruction    2. History of urologic surgery      Plan:     Assessment & Plan    - Patient experiencing recurrence of symptoms after UroLift in 2019  - Uroflow test shows low peak flow rate (<10 ml/s) and low average flow, suggestive of obstruction  - Ultrasound revealed prostate size of 33 cc with calcifications, possibly due to migrated UroLift implants  - Consider cystoscopy to evaluate for ectopic placement of UroLift implants in bladder, high bladder neck  - If obstruction confirmed, options include TURP or aquablation for more definitive tissue removal  - Consider urodynamics testing if cystoscopy inconclusive to evaluate bladder function    - Explained potential side effects of TURP and aquablation, including retrograde ejaculation  - Discussed possibility of bladder instability causing frequent urination and weak stream, not always resolved by removing prostate tissue    - Cystoscopy ordered    - continue Flomax 0.8 mg daily    - Follow up after cystoscopy to discuss results and potential next steps (urodynamics or prostate outlet procedure)        The visit today included increased complexity associated with the care of the episodic problem addressed above as well as managing the longitudinal care of the patient due to the serious and/or complex managed problem(s).      Portions of this note were generated by [employer] and [employer] Direct dictation services    David Martinez MD

## 2025-03-28 ENCOUNTER — TELEPHONE (OUTPATIENT)
Dept: UROLOGY | Facility: CLINIC | Age: 70
End: 2025-03-28
Payer: MEDICARE

## 2025-03-28 NOTE — TELEPHONE ENCOUNTER
----- Message from Agustina sent at 3/28/2025  8:27 AM CDT -----  Type:  Procedure cancelCaller is requesting a appointment. Name of Caller:pt Would the patient rather a call back or a response via [facility]? Please call Best Call Back Number:[phone] Information: pt has to cancel procedure on 04/01 and R/S     Please call back to advise. Thanks!

## 2025-05-02 ENCOUNTER — TELEPHONE (OUTPATIENT)
Dept: UROLOGY | Facility: CLINIC | Age: 70
End: 2025-05-02
Payer: MEDICARE

## 2025-05-02 ENCOUNTER — PROCEDURE VISIT (OUTPATIENT)
Dept: UROLOGY | Facility: CLINIC | Age: 70
End: 2025-05-02
Payer: MEDICARE

## 2025-05-02 VITALS — HEIGHT: 68 IN | WEIGHT: 205.5 LBS | BODY MASS INDEX: 31.14 KG/M2

## 2025-05-02 DIAGNOSIS — N13.8 BPH WITH URINARY OBSTRUCTION: Primary | ICD-10-CM

## 2025-05-02 DIAGNOSIS — N40.1 BPH WITH URINARY OBSTRUCTION: Primary | ICD-10-CM

## 2025-05-02 LAB
BILIRUBIN, UA POC OHS: NEGATIVE
BLOOD, UA POC OHS: NEGATIVE
CLARITY, UA POC OHS: CLEAR
COLOR, UA POC OHS: YELLOW
GLUCOSE, UA POC OHS: NEGATIVE
KETONES, UA POC OHS: ABNORMAL
LEUKOCYTES, UA POC OHS: NEGATIVE
NITRITE, UA POC OHS: NEGATIVE
PH, UA POC OHS: 6
PROTEIN, UA POC OHS: NEGATIVE
SPECIFIC GRAVITY, UA POC OHS: 1.01
UROBILINOGEN, UA POC OHS: 0.2

## 2025-05-02 RX ORDER — LIDOCAINE HYDROCHLORIDE 20 MG/ML
JELLY TOPICAL ONCE
OUTPATIENT
Start: 2025-05-02 | End: 2025-05-02

## 2025-05-02 NOTE — TELEPHONE ENCOUNTER
Patient called back and I was able to schedule the patient in for an appointment to get the FUDS procedure done with Dr. Blackwell     ----- Message from Bam Blackwell MD sent at 5/2/2025 12:13 PM CDT -----  Michelle,Can you please schedule for VUDS with me. Thank you,  ----- Message -----  From: Angelica Combs MA  Sent: 5/2/2025  11:57 AM CDT  To: Bam Blackwell MD    Good Afternoon,Can we get this pt scheduled for UDS for Dr. Martinez .Thank you so much,Angelica

## 2025-05-02 NOTE — TELEPHONE ENCOUNTER
5/2/25 @ 2 pm called patient to schedule urodynamics procedure no answer left message for patient to call back and get scheduled in

## 2025-05-02 NOTE — PROCEDURES
Cystoscopy    Date/Time: 5/2/2025 11:30 AM    Performed by: David Martinez MD  Authorized by: David Martinez MD      Procedure:   Flexible cysto-urethroscopy    Pre Procedure Diagnosis:  BPH, LUTS, and history of prior urologic surgery    Post Procedure Diagnosis:  Same    Surgeon: David Martinez MD     Anesthesia: 2% uro-jet lidocaine jelly for local analgesia    Procedure note:  The risks and benefits were explained and informed consent was obtained. 2% lidocaine urojet was used for local analgesia. The genitalia was prepped and draped in the sterile fashion.    The flexible scope was advanced into the urethra and into the bladder. The urethra was noted to have mild bulbar urethral stricture which was easily navigated by the scope.    The bladder was completely surveyed in a systematic fashion. The ureteral orifices were identified in their normal orthotopic locations bilaterally. There were no foreign bodies or stones. There were 2+ trabeculations and no diverticula. No bladder tumors or lesions suspicious for malignancy were seen.     Upon retroflexion there was no significant median lobe enlargement. There was evidence of the urolift implant which could be visualized through a small layer of mucosa on the bladder side. No definitively exposed and no calcification. As the flexible cystoscope was being withdrawn, the bladder neck and prostate were evaluated. There was a mildly high bladder neck. The lateral lobes of the prostate were not significantly enlarged.     The patient tolerated the procedure well without complication.     Findings in summary:  Mild BPH, mildly high bladder neck    Assessment: previous uroflow showed    Uroflow 2/13/2025:   Voiding time: 91.6 s  Flow rate: 88 s  Time to peakflow: 11.2 s  Peak flowrate: 9.7 ml/s  Average flowrate: 4.6 ml/s  Intervals: 4  Voided volume: 404 ml   PVR: 72 mL    Discussed urodynamics vs proceeding with an outlet procedure    Plan:  Referral for UDS with   Rosalva  Follow up after UDS

## 2025-05-23 ENCOUNTER — CLINICAL SUPPORT (OUTPATIENT)
Dept: OPHTHALMOLOGY | Facility: CLINIC | Age: 70
End: 2025-05-23
Payer: MEDICARE

## 2025-05-23 ENCOUNTER — OFFICE VISIT (OUTPATIENT)
Dept: OPTOMETRY | Facility: CLINIC | Age: 70
End: 2025-05-23
Payer: MEDICARE

## 2025-05-23 DIAGNOSIS — H52.203 HYPEROPIA WITH ASTIGMATISM AND PRESBYOPIA, BILATERAL: ICD-10-CM

## 2025-05-23 DIAGNOSIS — H25.13 AGE-RELATED NUCLEAR CATARACT, BILATERAL: ICD-10-CM

## 2025-05-23 DIAGNOSIS — H52.4 HYPEROPIA WITH ASTIGMATISM AND PRESBYOPIA, BILATERAL: ICD-10-CM

## 2025-05-23 DIAGNOSIS — H40.1112 PRIMARY OPEN ANGLE GLAUCOMA (POAG) OF RIGHT EYE, MODERATE STAGE: Primary | ICD-10-CM

## 2025-05-23 DIAGNOSIS — H40.1121 PRIMARY OPEN ANGLE GLAUCOMA (POAG) OF LEFT EYE, MILD STAGE: ICD-10-CM

## 2025-05-23 DIAGNOSIS — Z98.890 HISTORY OF REPAIR OF RETINAL TEAR BY LASER PHOTOCOAGULATION: ICD-10-CM

## 2025-05-23 DIAGNOSIS — H40.1112 PRIMARY OPEN ANGLE GLAUCOMA (POAG) OF RIGHT EYE, MODERATE STAGE: ICD-10-CM

## 2025-05-23 DIAGNOSIS — H52.03 HYPEROPIA WITH ASTIGMATISM AND PRESBYOPIA, BILATERAL: ICD-10-CM

## 2025-05-23 PROCEDURE — 99999 PR PBB SHADOW E&M-EST. PATIENT-LVL III: CPT | Mod: PBBFAC,,,

## 2025-05-23 PROCEDURE — 99213 OFFICE O/P EST LOW 20 MIN: CPT | Mod: PBBFAC,PO

## 2025-05-23 NOTE — PROGRESS NOTES
HPI    Pt presents today for a dfe.  Pt c/o closed captions on tv being blurry and halos around lights when   driving at night.  Pt wears OTC +3.00 readers.  Instills Latanoprost qhs OU.  Denies ocular pain/disc.  Denies F/F.    HTN controlled w/ meds.  Last edited by Mara Burnett on 5/23/2025 11:09 AM.            Assessment /Plan     For exam results, see Encounter Report.    Primary open angle glaucoma (POAG) of right eye, moderate stage  -     Posterior Segment OCT Optic Nerve- Both eyes    Primary open angle glaucoma (POAG) of left eye, mild stage  -     Posterior Segment OCT Optic Nerve- Both eyes    Age-related nuclear cataract, bilateral    History of repair of retinal tear by laser photocoagulation    Hyperopia with astigmatism and presbyopia, bilateral         1-2. Pt presented for annual exam with RNFL OCT and 24-2 HVF. IOP low and stable on Latanoprost QHS OU. Emphasized the importance of continued good compliance with drops to prevent worsening visual field loss. Pt to return in 6 months for IOP check and gonio.  IOP   10 // 10 - 05/23/25  10 // 11 - 11/12/24 13 // 12 - 05/28/24 16 // 14 - 01/23/24 09 // 09 - 07/28/23 09 // 08 - 04/28/23  Tmax: unknown, pt states it was mid 20s  RNFL OCT  05/23/25  OD: G(64), ST(87), T(60), IT(71), IN(66), N(52), SN(65); ONL  OS: G(77), ST(107), T(59), IT(109), IN(90), N(54), SN(87); Borderline   05/28/24  OD: G(62), ST(82), T(57), IT(70), IN(63), N(50), SN(64); ONL  OS: G(76), ST(105), T(58), IT(103), IN(88), N(54), SN(86); Borderline   04/28/23 - Baseline  OD: G(62), ST(82), T(57), IT(70), IN(63), N(50), SN(64); ONL   OS: G(76), ST(105), T(58), IT(103), IN(88), N(54), SN(86); Borderline   24-2 HVF  05/23/25  OD: reliable, GHT: ONL, dense superior arcuate, inferior arcuate   OS: reliable, GHT: N/A, early nasal step, early inferior arcuate  05/28/24  OD: borderline reliability, GHT: ONL, superior and inferior nasal step   OS: reliable, GHT: ONL, superior and  inferior nasal steps / early arcuates  04/28/23 - Baseline  OD: reliable, GHT: ONL, superior arcuate and inferior nasal step OD  OS: reliable, GHT: borderline, early superior and inferior nasal steps OS  Pachs: 489 // 489  Gonio: open to SS or  OD, OS.  (+)Family history, father    3. Mild, not yet visually significant. Surgery not recommended at this time. Ed pt on symptoms of worsening cataracts including reduced BCVA and glare. Monitor yearly for changes.    4. History of retinal tear with laser repair superior nasal OD. Stable with surrounding pigmentation and drusen. Retinal detachment precautions reviewed with pt. Monitor yearly for changes, sooner prn.     5. Pt happy with uncorrected DVA. Ok to continue with OTC readers as needed for near work. No spec rx given today.    RTC: ~6 months for IOP check and gonioscopy

## 2025-05-23 NOTE — PROGRESS NOTES
PT PRESENTS TODAY FOR A 24-2 HVF AND RNFL OCT.  PT COMPLIANT W/ TESTING INSTRUCTIONS.  PT HELD FIXATION WELL.  PT STATES HE DID NOT SEE ANY OF THE MOVING LIGHTS FOR A WHILE TOWARDS THE END OF TESTING OD.        Assessment /Plan     For exam results, see Encounter Report.    Primary open angle glaucoma (POAG) of right eye, moderate stage  -     Patino Visual Field - OU - Extended - Both Eyes    Primary open angle glaucoma (POAG) of left eye, mild stage  -     Patino Visual Field - OU - Extended - Both Eyes

## 2025-06-03 ENCOUNTER — OFFICE VISIT (OUTPATIENT)
Dept: NEUROSURGERY | Facility: CLINIC | Age: 70
End: 2025-06-03
Payer: MEDICARE

## 2025-06-03 VITALS
HEIGHT: 68 IN | WEIGHT: 205.5 LBS | SYSTOLIC BLOOD PRESSURE: 154 MMHG | BODY MASS INDEX: 31.14 KG/M2 | DIASTOLIC BLOOD PRESSURE: 90 MMHG | HEART RATE: 58 BPM

## 2025-06-03 DIAGNOSIS — M54.16 LUMBAR RADICULOPATHY, CHRONIC: ICD-10-CM

## 2025-06-03 DIAGNOSIS — M54.9 DORSALGIA, UNSPECIFIED: Primary | ICD-10-CM

## 2025-06-03 DIAGNOSIS — Z98.1 STATUS POST CERVICAL SPINAL FUSION: ICD-10-CM

## 2025-06-03 DIAGNOSIS — M48.07 SPINAL STENOSIS, LUMBOSACRAL REGION: ICD-10-CM

## 2025-06-03 DIAGNOSIS — M54.6 PAIN IN THORACIC SPINE: ICD-10-CM

## 2025-06-03 PROCEDURE — 99214 OFFICE O/P EST MOD 30 MIN: CPT | Mod: S$PBB,,, | Performed by: NURSE PRACTITIONER

## 2025-06-10 ENCOUNTER — PROCEDURE VISIT (OUTPATIENT)
Facility: CLINIC | Age: 70
End: 2025-06-10
Payer: MEDICARE

## 2025-06-10 ENCOUNTER — HOSPITAL ENCOUNTER (OUTPATIENT)
Dept: RADIOLOGY | Facility: HOSPITAL | Age: 70
Discharge: HOME | End: 2025-06-10
Attending: UROLOGY
Payer: MEDICARE

## 2025-06-10 VITALS
DIASTOLIC BLOOD PRESSURE: 71 MMHG | TEMPERATURE: 98 F | RESPIRATION RATE: 18 BRPM | HEART RATE: 72 BPM | WEIGHT: 200.19 LBS | SYSTOLIC BLOOD PRESSURE: 105 MMHG | BODY MASS INDEX: 30.34 KG/M2 | HEIGHT: 68 IN

## 2025-06-10 DIAGNOSIS — N40.1 BPH WITH URINARY OBSTRUCTION: ICD-10-CM

## 2025-06-10 DIAGNOSIS — N13.8 BPH WITH URINARY OBSTRUCTION: ICD-10-CM

## 2025-06-10 PROCEDURE — 51600 INJECTION FOR BLADDER X-RAY: CPT | Mod: PBBFAC | Performed by: UROLOGY

## 2025-06-10 PROCEDURE — 51728 CYSTOMETROGRAM W/VP: CPT | Mod: 26,S$PBB,, | Performed by: UROLOGY

## 2025-06-10 PROCEDURE — 74455 X-RAY URETHRA/BLADDER: CPT | Mod: 26,S$PBB,, | Performed by: UROLOGY

## 2025-06-10 PROCEDURE — 51784 ANAL/URINARY MUSCLE STUDY: CPT | Mod: 26,S$PBB,51,GZ | Performed by: UROLOGY

## 2025-06-10 PROCEDURE — 51741 ELECTRO-UROFLOWMETRY FIRST: CPT | Mod: 26,S$PBB,51, | Performed by: UROLOGY

## 2025-06-10 PROCEDURE — 99999PBSHW PR PBB SHADOW TECHNICAL ONLY FILED TO HB: Mod: PBBFAC,,,

## 2025-06-10 PROCEDURE — 51741 ELECTRO-UROFLOWMETRY FIRST: CPT | Mod: PBBFAC | Performed by: UROLOGY

## 2025-06-10 PROCEDURE — 51784 ANAL/URINARY MUSCLE STUDY: CPT | Mod: PBBFAC | Performed by: UROLOGY

## 2025-06-10 PROCEDURE — 51797 INTRAABDOMINAL PRESSURE TEST: CPT | Mod: 26,S$PBB,, | Performed by: UROLOGY

## 2025-06-10 PROCEDURE — 51600 INJECTION FOR BLADDER X-RAY: CPT | Mod: S$PBB,51,, | Performed by: UROLOGY

## 2025-06-10 PROCEDURE — 51729 CYSTOMETROGRAM W/VP&UP: CPT | Mod: PBBFAC | Performed by: UROLOGY

## 2025-06-10 PROCEDURE — 74450 X-RAY URETHRA/BLADDER: CPT | Mod: TC

## 2025-06-10 PROCEDURE — 51797 INTRAABDOMINAL PRESSURE TEST: CPT | Mod: PBBFAC | Performed by: UROLOGY

## 2025-06-10 RX ORDER — LIDOCAINE HYDROCHLORIDE 20 MG/ML
JELLY TOPICAL ONCE
Status: DISCONTINUED | OUTPATIENT
Start: 2025-06-10 | End: 2025-06-10

## 2025-06-10 RX ADMIN — IOTHALAMATE MEGLUMINE 750 ML: 172 INJECTION URETERAL at 02:06

## 2025-06-10 NOTE — PROCEDURES
Urodynamic Report    [facility]       Referring Physician:  Bam Blackwell MD    YOB: 1955  Date of Exam: 6/10/2025    HPI: - Patient experiencing recurrence of symptoms after UroLift in 2019  - Uroflow test shows low peak flow rate (<10 ml/s) and low average flow, suggestive of obstruction  - Ultrasound revealed prostate size of 33 cc with calcifications, possibly due to migrated UroLift implants  - Consider cystoscopy to evaluate for ectopic placement of UroLift implants in bladder, high bladder neck  - If obstruction confirmed, options include TURP or aquablation for more definitive tissue removal  - Consider urodynamics testing if cystoscopy inconclusive to evaluate bladder function    Cystometrogram:    Position: Sitting  Filling Rate: 50 mL/sec   Catheter: 7F  Fluid:Conray       Cath PVR prior: 300 mL Voiding Diary Capacity: Not Available   First Sensation: 236 mL First Desire: 345 mL   Strong Desire: 495 mL Cystometric Capacity: 688 mL       Pdet at halfway: 5 cm H2O Compliance: Normal       Detrusor Overactivity (DO): Absent  Volume first DO: No DO   Max DO Pressure: No DO Urgency Incontinence: Absent        Leak Point Pressure Testing:        Volume Tested: 150 mL-Capacity  Valsalva Leak Point Pressure: >150 cmH2O   Stress Induced DO: Absent          Voiding Study:        Voided Volume: 132 mL PVR: 550 mL   Maximum Flow: 4 mL/sec Average Flow 2 mL/sec   Max Pdet: 53 cmH2O  Pdet at Max Flow: 40 cmH2O   EMG Storage: Normal Recruitment  EMG Voiding: Normal quiescence       Fluroscopic Imaging:        Bladder Contour: Smooth Vesicoureteral Reflux:No VUR   Bladder Neck at Rest: closed Bladder Neck/Urethra Voiding:Poorly seen/low flow       Impression:        Sensation:Normal    Capacity: Large    Compliance:Normal    Detrusor Overactivity:Absent    Continence: No Incontinence    Contractility: Hypocontractility and PRECIADO    Emptying:Unsatisfactory - Hypocontractility and PRECIADO     Coordination:Coordinated Voiding          Summary:  This study was performed in accordance with the current AUA/SUFU Guideline on Adult Urodynamics. On filling phase he demonstrates normal first and strong desire with a normal bladder capacity. There is no detrusor overactivity (DO) demonstrated on this exam (though absence of DO on urodynamic evaluation does not exclude it as causative agent of urgency symptoms). Bladder compliance at capacity is normal. On fluoroscopy, the bladder contour is smooth. There is no VUR demonstrated on this exam.     On stress testing, with adequate cough and valsalva effort, there is no HARSHAL demonstrated and patient reports no clinical history of HARSHAL.    On voiding phase, he waldo a voluntary detrusor contraction resulting in an attenuated flow and incomplete emptying. Maximum detrusor pressure is fairly normal but it does not appear the pressure is sustained for sufficient duration to empty completely. Moreover, pressure flow relationship suggests some degree of obstruction and it should be taken into account that relative hypocontractility may mask underlying obstruction. I see very little contrast beyond the bladder neck on VCUG. He fails to empty completely which is consistent with his clinical reports and his catheterized PVR prior to the study.     In summary, it would appear he has a relative hypocontractility with a detrusor contraction that is not sufficient in duration or magnitude to empty completely. However, at his maximum detrusor pressure of 53 cm H2O, we would expect a better flow rate and his detrusor pressure at Qmax does put his BOOI in the equivocal range. Detrusor hypocontractility may mask underlying obstruction. Given his options, I would suggest that minimizing his outlet resistance may be more likely to free him from catheterization rather than SNM. He may wish to consider SNM if he fails to empty despite further reduction of his outlet.

## 2025-06-10 NOTE — PATIENT INSTRUCTIONS
SIMPLE URODYNAMIC STUDY (SUDS) DISCHARGE INSTRUCTIONS    You have had a procedure that will require time to properly heal. Follow the instructions you have been given on how to care for yourself once you are home. Below is additional information to help in your recovery.    ACTIVITY  There are no restrictions in activity. Start doing again the things you did before the procedure.  You may experience a slight burning sensation. You may notice a small amount of blood in your urine. This will clear up within a day. Call the clinic if this continues beyond 48 hours.     DIET  Continue your normal diet. You may eat the same foods you ate before your procedure.  Drink plenty of fluids during the first 24-48 hours following your procedure.     MEDICATIONS  Resume all other previous medications from your prescribing physician.  Continue any pre-procedure antibiotics until they are all gone.     SIGNS AND SYMPTOMS TO REPORT TO THE DOCTOR  Chills or fever greater than 101° F within 24 hours of procedure.  Changes in urination, such as increased bleeding, foul smell, cloudy urine, or painful urination.  Call your doctor with any questions or concerns.     For any emergency situation, call 911 immediately or go to your nearest emergency room.     [facility]  [phone]

## 2025-06-11 ENCOUNTER — OFFICE VISIT (OUTPATIENT)
Facility: CLINIC | Age: 70
End: 2025-06-11
Payer: MEDICARE

## 2025-06-11 DIAGNOSIS — L30.8 OTHER ECZEMA: Primary | ICD-10-CM

## 2025-06-11 DIAGNOSIS — L70.0 COMEDONE: ICD-10-CM

## 2025-06-11 DIAGNOSIS — L73.8 SEBACEOUS HYPERPLASIA: ICD-10-CM

## 2025-06-11 DIAGNOSIS — D23.9 DERMATOFIBROMA: ICD-10-CM

## 2025-06-11 DIAGNOSIS — H01.135 ECZEMATOUS DERMATITIS OF UPPER AND LOWER EYELIDS OF BOTH EYES: ICD-10-CM

## 2025-06-11 DIAGNOSIS — L81.4 LENTIGINES: ICD-10-CM

## 2025-06-11 DIAGNOSIS — L21.9 SEBORRHEIC DERMATITIS: ICD-10-CM

## 2025-06-11 DIAGNOSIS — H01.132 ECZEMATOUS DERMATITIS OF UPPER AND LOWER EYELIDS OF BOTH EYES: ICD-10-CM

## 2025-06-11 DIAGNOSIS — L57.0 AK (ACTINIC KERATOSIS): ICD-10-CM

## 2025-06-11 DIAGNOSIS — H01.131 ECZEMATOUS DERMATITIS OF UPPER AND LOWER EYELIDS OF BOTH EYES: ICD-10-CM

## 2025-06-11 DIAGNOSIS — H01.134 ECZEMATOUS DERMATITIS OF UPPER AND LOWER EYELIDS OF BOTH EYES: ICD-10-CM

## 2025-06-11 DIAGNOSIS — L82.1 SK (SEBORRHEIC KERATOSIS): ICD-10-CM

## 2025-06-11 DIAGNOSIS — D18.01 CHERRY ANGIOMA: ICD-10-CM

## 2025-06-11 PROCEDURE — 17000 DESTRUCT PREMALG LESION: CPT | Mod: PBBFAC,PN | Performed by: DERMATOLOGY

## 2025-06-11 PROCEDURE — 99204 OFFICE O/P NEW MOD 45 MIN: CPT | Mod: 25,S$PBB,, | Performed by: DERMATOLOGY

## 2025-06-11 PROCEDURE — 99999 PR PBB SHADOW E&M-EST. PATIENT-LVL III: CPT | Mod: PBBFAC,,, | Performed by: DERMATOLOGY

## 2025-06-11 PROCEDURE — 99213 OFFICE O/P EST LOW 20 MIN: CPT | Mod: PBBFAC,PN | Performed by: DERMATOLOGY

## 2025-06-11 PROCEDURE — 17000 DESTRUCT PREMALG LESION: CPT | Mod: S$PBB,,, | Performed by: DERMATOLOGY

## 2025-06-11 RX ORDER — PIMECROLIMUS 10 MG/G
CREAM TOPICAL 2 TIMES DAILY PRN
Qty: 30 G | Refills: 6 | Status: SHIPPED | OUTPATIENT
Start: 2025-06-11

## 2025-06-11 NOTE — PATIENT INSTRUCTIONS
CRYOSURGERY      Your doctor has used a method called cryosurgery to treat your skin condition. Cryosurgery refers to the use of very cold substances to treat a variety of skin conditions such as warts, pre-skin cancers, molluscum contagiosum, sun spots, and several benign growths. The substance we use in cryosurgery is liquid nitrogen and is so cold (-195 degrees Celsius) that is burns when administered.     Following treatment in the office, the skin may immediately burn and become red. You may find the area around the lesion is affected as well. It is sometimes necessary to treat not only the lesion, but a small area of the surrounding normal skin to achieve a good response.     A blister, and even a blood filled blister, may form after treatment.   This is a normal response. If the blister is painful, it is acceptable to sterilize a needle and with rubbing alcohol and gently pop the blister. It is important that you gently wash the area with soap and warm water as the blister fluid may contain wart virus if a wart was treated. Do no remove the roof of the blister.     The area treated can take anywhere from 1-3 weeks to heal. Healing time depends on the kind skin lesion treated, the location, and how aggressively the lesion was treated. It is recommended that the areas treated are covered with Vaseline or bacitracin ointment and a band-aid. If a band-aid is not practical, just ointment applied several times per day will do. Keeping these areas moist will speed the healing time.    Treatment with liquid nitrogen can leave a scar. In dark skin, it may be a light or dark scar, in light skin it may be a white or pink scar. These will generally fade with time, but may never go away completely.     If you have any concerns after your treatment, please feel free to call the office.       [address] [facility] [place] [address]/ [phone] FAX/ [email or web address] What Are the Symptoms of Skin  Cancer?  A change in your skin is the most common sign of skin cancer. This could be a new growth, a sore that doesnt heal, or a change in a mole. Not all skin cancers look the same.    For melanoma specifically, a simple way to remember the warning signs is to remember the A-B-C-D-Es of melanoma--    A stands for asymmetrical. Does the mole or spot have an irregular shape with two parts that look very different?  B stands for border. Is the border irregular or jagged?  C is for color. Is the color uneven?  D is for diameter. Is the mole or spot larger than the size of a pea?  E is for evolving. Has the mole or spot changed during the past few weeks or months?    Talk to your doctor if you notice changes in your skin such as a new growth, a sore that doesnt heal, a change in an old growth, or any of the A-B-C-D-Es of melanoma    What Can I Do to Reduce My Risk of Skin Cancer?  Protection from ultraviolet (UV) radiation is important all year, not just during the summer or at the beach. UV rays from the sun can reach you on cloudy and hazy days, not just on bright and ash days. UV rays also reflect off of surfaces like water, cement, sand, and snow. Indoor tanning (using a tanning bed, barnard, or sunlamp to get tan) exposes users to UV radiation.    The hours between 10 a.m. and 4 p.m. Daylight Saving Time (9 a.m. to 3 p.m. standard time) are the most hazardous for UV exposure outdoors in the continental [place]. UV rays from sunlight are the greatest during the late spring and early summer in [place].    CDC recommends easy options for protection from UV radiation--    Stay in the shade or indoors, especially during midday hours.  Wear clothing that covers your arms and legs.  Wear a hat with a wide brim to shade your face, head, ears, and neck.  Wear sunglasses that wrap around and block both UVA and UVB rays.  Use sunscreen with a sun protection factor (SPF) of 30 or higher, and both UVA  and UVB (broad spectrum) protection.  Avoid indoor tanning.    Adapted from https://www.cdc.gov/cancer/skin/basic_info/

## 2025-06-11 NOTE — PROGRESS NOTES
Subjective:      Patient ID:  Chau Price is a 69 y.o. male who presents for   Chief Complaint   Patient presents with    Skin Check     TBSC     HPI    New patient.  Here today for total body skin exam.   No personal or known family hx skin cancer.    C/o spot at scalp; also c/o rash rashes at eyelids, behind ears. Prior treatment w fluticasone cream somewhat helpful. Using otc antidandruff shampoo to scalp.     Review of Systems    Objective:   Physical Exam   Constitutional: He appears well-developed and well-nourished. He is cooperative.   HENT:   Head: Normocephalic and atraumatic.   Eyes: Lids are normal. Lids are normal.  Right conjunctiva is not injected. Left conjunctiva is not injected. No conjunctival no injection.   Pulmonary/Chest: No respiratory distress.   Musculoskeletal:      Right lower leg: No edema.      Left lower leg: No edema.   Neurological: He is alert and oriented to person, place, and time.   Psychiatric: He has a normal mood and affect. His speech is normal and behavior is normal. Mood, affect, judgment and thought content normal.   Skin:   Areas Examined (abnormalities noted in diagram):   Scalp / Hair Palpated and Inspected  Head / Face Inspection Performed  Neck Inspection Performed  Chest / Axilla Inspection Performed  Abdomen Inspection Performed  Genitals / Buttocks / Groin Inspection Performed  Back Inspection Performed  RUE Inspected  LUE Inspection Performed  RLE Inspected  LLE Inspection Performed  Nails and Digits Inspection Performed                 Diagram Legend     Erythematous scaling macule/papule c/w actinic keratosis       Vascular papule c/w angioma      Pigmented verrucoid papule/plaque c/w seborrheic keratosis      Yellow umbilicated papule c/w sebaceous hyperplasia      Irregularly shaped tan macule c/w lentigo     1-2 mm smooth white papules consistent with Milia      Movable subcutaneous cyst with punctum c/w epidermal inclusion cyst      Subcutaneous  movable cyst c/w pilar cyst      Firm pink to brown papule c/w dermatofibroma      Pedunculated fleshy papule(s) c/w skin tag(s)      Evenly pigmented macule c/w junctional nevus     Mildly variegated pigmented, slightly irregular-bordered macule c/w mildly atypical nevus      Flesh colored to evenly pigmented papule c/w intradermal nevus       Pink pearly papule/plaque c/w basal cell carcinoma      Erythematous hyperkeratotic cursted plaque c/w SCC      Surgical scar with no sign of skin cancer recurrence      Open and closed comedones      Inflammatory papules and pustules      Verrucoid papule consistent consistent with wart     Erythematous eczematous patches and plaques     Dystrophic onycholytic nail with subungual debris c/w onychomycosis     Umbilicated papule    Erythematous-base heme-crusted tan verrucoid plaque consistent with inflamed seborrheic keratosis     Erythematous Silvery Scaling Plaque c/w Psoriasis     See annotation      Assessment / Plan:        AK (actinic keratosis)  - Discussed diagnosis, etiology, and precancerous nature of condition.   - Cryosurgery Procedure Note: Discussed procedure with patient/patient's guardian including risks and benefits as well as treatment alternatives. Risks of procedure include pain, itching, swelling, redness, blistering, crusting, wound formation, post-inflammatory pigmentary alteration, scar, recurrence. Verbal consent obtained. LN2 cryosurgery performed to 1 lesion(s). Patient tolerated procedure well. After-visit wound care instructions reviewed and provided in writing.      Lentigines  - Benign; reassured treatment not necessary.   - Recommended daily sun protection, including the use of OTC broad-spectrum sunscreen (SPF 30 or greater) and sun-protective clothing.       SK (seborrheic keratosis)  Cherry angioma  Sebaceous hyperplasia  Comedone  Dermatofibroma  - Benign; reassured treatment not necessary.      Skin cancer screening  - Total body skin  examination performed today.  - Findings listed above.   - Recommended routine self examination of skin.    - Recommended daily sun protection, including the use of OTC broad-spectrum sunscreen (SPF 30 or greater) and sun-protective clothing.      Other eczema  -     pimecrolimus (ELIDEL) 1 % cream; Apply topically 2 (two) times daily as needed (rash at eyelids).  Dispense: 30 g; Refill: 6    Eczematous dermatitis of upper and lower eyelids of both eyes  - Discussed diagnosis, etiology, and treatment options.  - Counseled on gentle, dry skin care and avoidance of common allergens/irritants.    - Elidel cream BID PRN rash flares.   - Counseled on potential SE of medication(s) and instructed on use.     Seborrheic dermatitis  - Discussed diagnosis, etiology, and treatment options.  - Start including in/around ears with scalp when using otc antidandruff shampoo, at least 2-3x weekly. Let sit a few  minutes prior to rinsing.   - Elidel cream BID PRN rash/scaling at postauricular crease.   - Counseled on potential SE of medication(s) and instructed on use.            Follow up for annual skin checks, sooner PRN.

## 2025-06-12 ENCOUNTER — TELEPHONE (OUTPATIENT)
Dept: UROLOGY | Facility: CLINIC | Age: 70
End: 2025-06-12
Payer: MEDICARE

## 2025-06-12 NOTE — TELEPHONE ENCOUNTER
----- Message from David Martinez MD sent at 6/12/2025  7:06 AM CDT -----  Please schedule a visit in the next week to discuss BPH. Okay to double book

## 2025-06-17 ENCOUNTER — OFFICE VISIT (OUTPATIENT)
Dept: UROLOGY | Facility: CLINIC | Age: 70
End: 2025-06-17
Payer: MEDICARE

## 2025-06-17 VITALS — WEIGHT: 206.13 LBS | BODY MASS INDEX: 31.24 KG/M2 | HEIGHT: 68 IN

## 2025-06-17 DIAGNOSIS — N40.1 BPH WITH URINARY OBSTRUCTION: Primary | ICD-10-CM

## 2025-06-17 DIAGNOSIS — Z98.890 HISTORY OF UROLOGIC SURGERY: ICD-10-CM

## 2025-06-17 DIAGNOSIS — R39.14 FEELING OF INCOMPLETE BLADDER EMPTYING: ICD-10-CM

## 2025-06-17 DIAGNOSIS — L30.8 OTHER ECZEMA: ICD-10-CM

## 2025-06-17 DIAGNOSIS — N13.8 BPH WITH URINARY OBSTRUCTION: Primary | ICD-10-CM

## 2025-06-17 PROCEDURE — G2211 COMPLEX E/M VISIT ADD ON: HCPCS | Mod: ,,, | Performed by: STUDENT IN AN ORGANIZED HEALTH CARE EDUCATION/TRAINING PROGRAM

## 2025-06-17 PROCEDURE — 99214 OFFICE O/P EST MOD 30 MIN: CPT | Mod: PBBFAC,PO | Performed by: STUDENT IN AN ORGANIZED HEALTH CARE EDUCATION/TRAINING PROGRAM

## 2025-06-17 PROCEDURE — 99999 PR PBB SHADOW E&M-EST. PATIENT-LVL IV: CPT | Mod: PBBFAC,,, | Performed by: STUDENT IN AN ORGANIZED HEALTH CARE EDUCATION/TRAINING PROGRAM

## 2025-06-17 PROCEDURE — 99214 OFFICE O/P EST MOD 30 MIN: CPT | Mod: S$PBB,,, | Performed by: STUDENT IN AN ORGANIZED HEALTH CARE EDUCATION/TRAINING PROGRAM

## 2025-06-17 NOTE — PROGRESS NOTES
"[facility] - Urology   Clinic Note    Subjective:     Chief Complaint: Benign Prostatic Hypertrophy      History of Present Illness    CHIEF COMPLAINT:  Chau presents today for follow-up regarding incomplete bladder emptying    UROLOGIC HISTORY:   He has a history of UroLift procedure. He reports weak urinary stream with prolonged urination and dribbling. Generic Flomax has helped improve nighttime urinary frequency. Uroflow test showed a peak flow rate less than 10 mL/second, suggestive of urinary obstruction.    Video urodynamics with Dr. Blackwell from 06/10/2025 showed large bladder capacity with hypocontractility / bladder outlet obstruction with an elevated PVR.  Bladder outlet obstruction index was equivocal.      Prostate volume 33 mL.    Uroflow 2/13/2025:   Voiding time: 91.6 s  Flow rate: 88 s  Time to peakflow: 11.2 s  Peak flowrate: 9.7 ml/s  Average flowrate: 4.6 ml/s  Voided volume: 404 ml   PVR: 72 mL      PVR   Latest Ref Rng 0 - 100 mL   1/24/2023 29    11/5/2024 194 !    2/10/2025 84          IPSS Questionnaire (AUA-SS):       1) Incomplete Emptying 5 - Almost always   2) Frequency 5 - Almost always   3) Intermittency 5 - Almost always   4) Urgency 5 - Almost always   5) Weak Stream  5 - Almost always   6) Straining 5 - Almost always   7) Nocturia 2 - 2 times   Total score:    32        Past medical, family, surgical and social history reviewed as documented in chart with pertinent positive medical, family, surgical and social history detailed in HPI.    A review systems was conducted with pertinent positive and negative findings documented in HPI.    Objective:     Estimated body mass index is 31.34 kg/m² as calculated from the following:    Height as of this encounter: 5' 8" (1.727 m).    Weight as of this encounter: 93.5 kg (206 lb 2.1 oz).    Vital Signs (Most Recent)       General: No acute distress, well developed.   Head: Normocephalic, atraumatic  CV: no cyanosis  Lungs: normal respiratory " effort, no respiratory distress    Labs reviewed below:  Lab Results   Component Value Date    BUN 14 01/02/2025    CREATININE 0.86 01/02/2025    WBC 4.58 01/02/2025    HGB 13.0 (L) 01/02/2025    HCT 38.6 (L) 01/02/2025     (L) 01/02/2025    AST 27 01/02/2025    ALT 22 01/02/2025    ALKPHOS 64 01/02/2025    ALBUMIN 4.2 01/02/2025    HGBA1C 5.0 06/01/2023        PSA trend reviewed below:  Lab Results   Component Value Date    PSA 0.55 08/12/2024    PSA 0.56 06/01/2023    PSA 0.70 07/01/2022        Lab Results   Component Value Date    BLOODUA Negative 05/02/2025    WBCUR Negative 05/02/2025    NITRITE Negative 05/02/2025      Assessment:     1. BPH with urinary obstruction    2. History of urologic surgery    3. Feeling of incomplete bladder emptying      Plan:     Assessment & Plan    - Considered TURP or Aquablation as definitive outlet procedures for incomplete bladder emptying and weak stream.  - Discussed potential risks of erectile dysfunction, chance of retrograde ejaculation, risk of urethral stricture.  - Noted TURP should improve stream and bladder emptying, may not completely resolve all storage urinary symptoms.  - Considered catheter removal timeline based on bleeding and clarity of urine, potentially 2-5 days post-procedure.  - Explained TURP procedure: trimming prostate tissue, addressing bladder neck, and removing UroLift clips.  - Explained potential need for future therapies focused on bladder function.    - Described expected recovery timeline: potential urgency and frequency for first couple of weeks, full bladder function recovery may take up to 3 months.  - Discussed post-op bleeding: blood in urine expected on and off for a few weeks.    - TURP procedure to be scheduled at [facility].       The visit today included increased complexity associated with the care of the episodic problem addressed above as well as managing the longitudinal care of the patient due to the serious  and/or complex managed problem(s).      Portions of this note were generated by [employer] and [facility] Direct dictation services    David Martinez MD

## 2025-06-18 ENCOUNTER — OFFICE VISIT (OUTPATIENT)
Dept: NEUROSURGERY | Facility: CLINIC | Age: 70
End: 2025-06-18
Payer: MEDICARE

## 2025-06-18 VITALS
RESPIRATION RATE: 18 BRPM | WEIGHT: 206.13 LBS | BODY MASS INDEX: 31.24 KG/M2 | SYSTOLIC BLOOD PRESSURE: 138 MMHG | DIASTOLIC BLOOD PRESSURE: 80 MMHG | HEIGHT: 68 IN | HEART RATE: 56 BPM

## 2025-06-18 DIAGNOSIS — Z98.1 HISTORY OF SPINAL FUSION: Primary | ICD-10-CM

## 2025-06-18 DIAGNOSIS — M51.360 DEGENERATION OF INTERVERTEBRAL DISC OF LUMBAR REGION WITH DISCOGENIC BACK PAIN: ICD-10-CM

## 2025-06-18 PROCEDURE — 99214 OFFICE O/P EST MOD 30 MIN: CPT | Mod: S$PBB,,, | Performed by: STUDENT IN AN ORGANIZED HEALTH CARE EDUCATION/TRAINING PROGRAM

## 2025-06-18 RX ORDER — PIMECROLIMUS 10 MG/G
CREAM TOPICAL 2 TIMES DAILY PRN
Qty: 30 G | Refills: 6 | Status: SHIPPED | OUTPATIENT
Start: 2025-06-18

## 2025-06-18 NOTE — PROGRESS NOTES
Neurosurgery History & Physical    Patient ID: Chau Price is a 69 y.o. male.    Chief Complaint   Patient presents with    Follow-up     F/U with imaging       History of Present Illness:   69-year-old male with history of C4-7 ACDF and L4-5 ALIF who presents today for evaluation of mid and low back pain.  He reports no significant improvement after his lumbar spine surgery.  He has dealt with this conservatively through weight loss, therapy and weightlifting.  However the pain has slowly progressed over the last several years.  He reports additional bilateral lower extremity pain in the L4, L5 distribution, right greater than left.  His leg pain is worse when lying down but the low back pain is constant despite position or activity changes. He has intermittent numbness in his toes.     His midback pain is in the upper thoracic region and across into the shoulder blades. No circumferential radiation of pain, numbness/tingling. Denies gait issues, BB dysfunction.     He has seen Dr. Christine in the past who did not recommend any further surgery. He is not eager to pursue surgery but the pain is progressive and hindering his quality of life.     He has done multiple rounds of PT and pain procedures without lasting benefit. He takes norco occasionally but NSAIDs are most beneficial though he must use this sparingly d/t hx of UC.    He had an MRI 8 months ago ordered by PCP. He would like a thoracic MRI and cervical XRs to monitor fusion.    Review of Systems  Constitutional: no fever, chills or night sweats. No changes in weight   Eyes: no visual changes   ENT: no nasal congestion or sore throat   Respiratory: no cough or shortness of breath   Cardiovascular: no chest pain or palpitations   Gastrointestinal: no nausea or vomiting   Genitourinary: no hematuria or dysuria   Integument/Breast: no rash or pruritis   Hematologic/Lymphatic: no easy bruising or lymphadenopathy   Musculoskeletal: no arthralgias or  "myalgias.   Neurological: +mid back, low back pain.  no seizures or tremors   Behavioral/Psych: no auditory or visual hallucinations   Endocrine: no heat or cold intolerance     Past Medical History:   Diagnosis Date    Aortic root dilation 04/17/2023 4/23  CT-4.1 cm      Arthritis     Elevated coronary artery calcium score 07/19/2024 4/23  The total coronary calcium score is 1237      2023     Coronary angiography:    Right dominant coronary system   Left main.  Large in caliber, mild calcification, no stenosis   Left anterior descending.  Large in caliber, moderate calcification, mild luminal irregularities.  D1 is a large caliber vessel, mild calcification, mild luminal irregularities.    Ramus:  Medium in caliber, mild calc    Glaucoma     Hypertension     Other ulcerative colitis with rectal bleeding     Resistant hypertension 07/19/2024    Sleep apnea     USES C-PAP     Social History[1]  Family History   Problem Relation Name Age of Onset    Cancer Mother          lung    Glaucoma Father      Cancer Father          bladder    Macular degeneration Neg Hx      Amblyopia Neg Hx      Blindness Neg Hx      Cataracts Neg Hx      Retinal detachment Neg Hx       Review of patient's allergies indicates:   Allergen Reactions    Azathioprine Other (See Comments)     Megacolon, "colon almost burst"    Other reaction(s): Not available    azathioprine    Mercaptopurine Other (See Comments)     Megacolon, "colon almost burst"    Metoclopramide hcl     Morphine      Current Medications[2]  Blood pressure 138/80, pulse (!) 56, resp. rate 18, height 5' 8" (1.727 m), weight 93.5 kg (206 lb 2.1 oz).      Neurological Exam  General: well developed, well nourished, no distress.   Neurologic: Alert and oriented. Thought content appropriate.  Cranial nerves: face symmetric, EOMI.   Motor Strength: Moves all extremities spontaneously with good tone. No abnormal movements seen.      Strength   Deltoids Triceps Biceps Wrist " Extension Wrist Flexion Hand    Upper: R 5/5 5/5 5/5 5/5 5/5 5/5     L 5/5 5/5 5/5 5/5 5/5 5/5       Iliopsoas Quadriceps Knee  Flexion Tibialis  anterior Gastro- cnemius EHL   Lower: R 5/5 5/5 5/5 5/5 5/5 5/5     L 5/5 5/5 5/5 5/5 5/5 5/5        Gait: normal    Sensory: intact to light touch throughout  DTR's - 2+ and symmetric in UE and LE  Meek: absent  Clonus: absent  Pulm: Normal respiratory effort  Skin: Intact, no visible rashes or lesions     Imaging:   MRI L spine 10/3/2024:  Impression:     1. L4-5 anterior fusion with L4 and L5 laminectomies.  2. Multilevel spondylosis, greatest at L3-4 where there is mild-moderate narrowing of the lateral recesses bilaterally without significant spinal canal stenosis.  3. Multilevel foraminal stenosis, greatest on the left at L3-4 and L4-5 and on the right at L5-S1 where it is mild-moderate.       Assessment & Plan:   69 year old male with hx of cervical and lumbar fusion, progressive lumbar issues and more recent midback pain. He would like to follow up with Dr. Christine regarding his L spine. He is unsure if he would consider further surgery but would like to know his options. I will obtain imaging and he can follow up with Dr. Christine to discuss.     Assessment recommendation    I he is relatively stable degeneration.  History of L4-5 fusion  He has disc degeneration at L5-S1 L3-4 L2-3  He has essentially degeneration throughout every level or thoracic spine with loss of disc height and disc osteophyte complexes  He has no significant central stenosis no cord compression  Mainly is dealing with flare-ups of diffuse axial pain including his lower back.  I do not think there is a reasonable surgical target he is without radiculopathy.  He could have some component of symptomatology from his adjacent segment at L3-4 where there is some Modic endplate change.  This could be a potential target for injection or even possibly intracept     I he sees Dr. 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 low-impact exercise plan which he will continue  Reviewed healthy spine biomechanics               [1]   Social History  Socioeconomic History    Marital status:    Tobacco Use    Smoking status: Former     Types: Cigars    Smokeless tobacco: Never   Substance and Sexual Activity    Alcohol use: Not Currently     Comment: social     Social Drivers of Health     Financial Resource Strain: Low Risk  (2/13/2025)    Overall Financial Resource Strain (CARDIA)     Difficulty of Paying Living Expenses: Not very hard   Food Insecurity: No Food Insecurity (2/13/2025)    Hunger Vital Sign     Worried About Running Out of Food in the Last Year: Never true     Ran Out of Food in the Last Year: Never true   Transportation Needs: No Transportation Needs (2/13/2025)    PRAPARE - Transportation     Lack of Transportation (Medical): No     Lack of Transportation (Non-Medical): No   Physical Activity: Sufficiently Active (2/13/2025)    Exercise Vital Sign     Days of Exercise per Week: 5 days     Minutes of Exercise per Session: 50 min   Stress: No Stress Concern Present (2/13/2025)    [employer] - Occupational Stress Questionnaire     Feeling of Stress : Not at all   Housing Stability: Low Risk  (2/13/2025)    Housing Stability Vital Sign     Unable to Pay for Housing in the Last Year: No     Number of Times Moved in the Last Year: 0     Homeless in the Last Year: No   [2]   Current Outpatient Medications:     ascorbic acid, vitamin C, (VITAMIN C) 500 MG tablet, Take 500 mg by mouth once daily., Disp: , Rfl:     aspirin (ECOTRIN) 81 MG EC tablet, Take 1 tablet (81 mg total) by mouth once daily., Disp: 90 tablet, Rfl: 3    augmented betamethasone dipropionate  (DIPROLENE-AF) 0.05 % cream, APPLY  CREAM TOPICALLY TO AFFECTED AREA TWICE DAILY AS NEEDED, Disp: 50 g, Rfl: 0    clindamycin phosphate 1% (CLINDAGEL) 1 % gel, Apply 1 application  topically 2 (two) times daily., Disp: , Rfl:     clobetasoL (TEMOVATE) 0.05 % cream, Apply topically. (Patient not taking: Reported on 6/11/2025), Disp: , Rfl:     diclofenac sodium (VOLTAREN ARTHRITIS PAIN) 1 % Gel, Apply 2 g topically 4 (four) times daily., Disp: 100 g, Rfl: 3    ezetimibe (ZETIA) 10 mg tablet, Take 1 tablet (10 mg total) by mouth once daily., Disp: 90 tablet, Rfl: 4    fexofenadine (ALLEGRA) 180 MG tablet, Take 180 mg by mouth once daily., Disp: , Rfl:     fluticasone propionate (CUTIVATE) 0.05 % cream, Apply topically once daily., Disp: 60 g, Rfl: 0    gabapentin (NEURONTIN) 600 MG tablet, TAKE 1 TABLET BY MOUTH DAILY IN THE MORNING AND 1/2 (ONE-HALF) TABLET AT LUNCH AND 1 TABLET IN THE EVENING, Disp: 90 tablet, Rfl: 0    HYDROcodone-acetaminophen (NORCO) 5-325 mg per tablet, Take 1 tablet by mouth every 4 (four) hours as needed for Pain., Disp: 42 tablet, Rfl: 0    HYDROcodone-acetaminophen (NORCO) 7.5-325 mg per tablet, Take 1 tablet by mouth every 8 (eight) hours as needed for Pain., Disp: 21 tablet, Rfl: 0    latanoprost 0.005 % ophthalmic solution, INSTILL 1 DROP INTO EACH EYE IN THE EVENING, Disp: 9 mL, Rfl: 3    mesalamine (LIALDA) 1.2 gram TbEC, Take 4 tablets (4.8 g total) by mouth daily with breakfast., Disp: 360 tablet, Rfl: 4    methocarbamoL (ROBAXIN) 750 MG Tab, Take 1 tablet (750 mg total) by mouth 3 (three) times daily as needed., Disp: 120 tablet, Rfl: 1    metoprolol succinate (TOPROL-XL) 25 MG 24 hr tablet, Take 1 tablet (25 mg total) by mouth every evening., Disp: 90 tablet, Rfl: 1    naproxen (NAPROSYN) 500 MG tablet, Take 1 tablet (500 mg total) by mouth 2 (two) times daily as needed., Disp: 180 tablet, Rfl: 1    omega 3-dha-epa-fish oil (FISH OIL) 1,200 (144-216) mg Cap, Take 1 capsule by mouth  Daily., Disp: , Rfl:     pantoprazole (PROTONIX) 20 MG tablet, TAKE 1 TABLET ONE TIME DAILY, Disp: 90 tablet, Rfl: 3    pimecrolimus (ELIDEL) 1 % cream, Apply topically 2 (two) times daily as needed (rash at eyelids)., Disp: 30 g, Rfl: 6    tamsulosin (FLOMAX) 0.4 mg Cap, Take 2 capsules (0.8 mg total) by mouth once daily., Disp: 180 capsule, Rfl: 3    valACYclovir (VALTREX) 1000 MG tablet, Take 2 tablets (2,000 mg total) by mouth 2 (two) times daily. For 1 day at first sign of outbreak, Disp: 4 tablet, Rfl: 5    valsartan (DIOVAN) 160 MG tablet, Take 1 tablet (160 mg total) by mouth once daily., Disp: 90 tablet, Rfl: 4

## 2025-06-30 ENCOUNTER — TELEPHONE (OUTPATIENT)
Dept: NEUROSURGERY | Facility: CLINIC | Age: 70
End: 2025-06-30
Payer: MEDICARE

## 2025-06-30 NOTE — TELEPHONE ENCOUNTER
Spoke with patient and scheduled follow up with Dr. Travis to discuss if he is a candidate for intracept procedure per Dr. Christine' suggestion

## 2025-07-07 ENCOUNTER — TELEPHONE (OUTPATIENT)
Dept: PAIN MEDICINE | Facility: CLINIC | Age: 70
End: 2025-07-07
Payer: MEDICARE

## 2025-07-07 NOTE — TELEPHONE ENCOUNTER
Copied from CRM #[id number]. Topic: Appointments - Appointment Rescheduling  >> Jul 7, 2025 10:53 AM Agustina wrote:  Type:  Appointment Request    Caller is requesting a appointment.     Name of Caller:pt     When is the first available appointment?pt rj 07/14 would like sooner appt     Symptoms:[ain in legs and pt can't sleep     Would the patient rather a call back or a response via [facility]? Pls call to get in sooner     Best Call Back Number:[phone]    Additional Information: pt has appt 07/14 @ 9:45 and would like to be seen sooner pls call     Please call back to advise. Thanks!

## 2025-07-14 ENCOUNTER — TELEPHONE (OUTPATIENT)
Dept: PAIN MEDICINE | Facility: CLINIC | Age: 70
End: 2025-07-14
Payer: MEDICARE

## 2025-07-14 ENCOUNTER — OFFICE VISIT (OUTPATIENT)
Dept: PAIN MEDICINE | Facility: CLINIC | Age: 70
End: 2025-07-14
Payer: MEDICARE

## 2025-07-14 VITALS
WEIGHT: 204.5 LBS | DIASTOLIC BLOOD PRESSURE: 89 MMHG | BODY MASS INDEX: 31.09 KG/M2 | SYSTOLIC BLOOD PRESSURE: 166 MMHG | HEART RATE: 60 BPM

## 2025-07-14 DIAGNOSIS — M54.16 LUMBAR RADICULOPATHY: Primary | ICD-10-CM

## 2025-07-14 DIAGNOSIS — Z98.1 HISTORY OF LUMBAR SPINAL FUSION: ICD-10-CM

## 2025-07-14 DIAGNOSIS — M47.816 LUMBAR SPONDYLOSIS: ICD-10-CM

## 2025-07-14 PROCEDURE — 99999 PR PBB SHADOW E&M-EST. PATIENT-LVL III: CPT | Mod: PBBFAC,,, | Performed by: ANESTHESIOLOGY

## 2025-07-14 PROCEDURE — 99213 OFFICE O/P EST LOW 20 MIN: CPT | Mod: PBBFAC,PO | Performed by: ANESTHESIOLOGY

## 2025-07-14 PROCEDURE — 99214 OFFICE O/P EST MOD 30 MIN: CPT | Mod: S$PBB,,, | Performed by: ANESTHESIOLOGY

## 2025-07-14 RX ORDER — OXYCODONE AND ACETAMINOPHEN 10; 325 MG/1; MG/1
1 TABLET ORAL NIGHTLY PRN
Qty: 20 TABLET | Refills: 0 | Status: SHIPPED | OUTPATIENT
Start: 2025-07-14

## 2025-07-14 RX ORDER — ALPRAZOLAM 1 MG/1
1 TABLET, ORALLY DISINTEGRATING ORAL ONCE AS NEEDED
OUTPATIENT
Start: 2025-07-14 | End: 2036-12-10

## 2025-07-14 NOTE — TELEPHONE ENCOUNTER
Physician - Dr Travis      Type of Procedure/Injection - Lumbar Transforaminal Epidural  L5/S1           Laterality - Right      Priority - Normal      Anxiolysis - Local      Fasting - NPO after midnight      Need to hold medication - Yes      Aspirin for 5 days      Clearance needed - Yes      Follow up - 3 week   Please see what we could do to get this scheduled ASAP, he can have this procedure done with anyone

## 2025-07-14 NOTE — TELEPHONE ENCOUNTER
Patient is scheduled for a lumbar transforaminal RICHMOND with Dr. Olivera on 7/21. Is he cleared to hold ASA for 5 days prior to the procedure?

## 2025-07-14 NOTE — PROGRESS NOTES
This note was completed with dictation software and grammatical errors may exist.    Chief Complaint   Patient presents with    Back Pain        HPI: Chau Price is a 69 y.o. year old male patient who has a past medical history of Aortic root dilation, Arthritis, Elevated coronary artery calcium score, Glaucoma, Hypertension, Other ulcerative colitis with rectal bleeding, Resistant hypertension, and Sleep apnea. He presents in referral from No ref. provider found for back and leg pain.   History of Present Illness    Mr. Price presents with chronic back pain and a new, severe radiating leg pain.    His back pain initially developed as general discomfort, worse in the mornings with difficulty standing up straight. Pain was exacerbated by going backwards, reaching up, long car rides, and standing for prolonged periods. Walking did not worsen the pain. He consulted Dr. Christine for this initial back pain, who suggested considering basivertebral nerve ablation.    His radiating leg pain developed approximately three weeks ago, after the consultation with Dr. Christine. He describes it as a crushing pain that radiates from the buttock area down the right leg to the foot, primarily occurring at night when he turns over in bed. Pain is characterized as severe pressure and travels mostly along the side of the leg, sometimes affecting the bottom of the foot. Pain typically begins about an hour or two after lying down and is triggered by movement, particularly when turning from the left side. Once started, pain can last for extended periods, up to 10 minutes the previous night. This has significantly disrupted his sleep, limiting it to about three hours per night. During the day, he experiences a milder, bearable version of the pain, described as a tingling sensation down the leg. He denies any similar symptoms in the left leg.    He has been taking Gabapentin 3 times daily and Hydrocodone for pain management, but  reports these medications are ineffective, stating they do not provide any relief for the pain.    Mr. Price has been taking Gabapentin 3 times daily, but it is not providing benefit for his current pain.    ROS:  General: +sleep disturbances  Musculoskeletal: +limb pain, +back pain, +pain with movement, +difficulty standing up, +nightime pain  Neurological: +tingling             Prior HPI: Chau Price is a 67 y.o. year old male patient who has a past medical history of Other ulcerative colitis with rectal bleeding. He presents in referral from No ref. provider found for back and leg pain.  The patient reports having left leg pain for the last 4 years, has undergone several surgeries and actually had undergone surgery at L4/5 about 1.5 years ago after which he reports his back pain is better but his left leg pain  And numbness still continues.  Feels that he actually has some slight weakness in his left foot.  He has been in physical therapy over the last year and continues to do exercises on his own.  Describes his pain as numb, sharp, shooting.  It starts at the hip and radiates to the ankle.  Is worse with lying down, he actually does better with standing and with some medications.  He denies any bowel or bladder incontinence.    Pain intervention history:  Had undergone some injections prior to his surgery.  He is status post left L4/5 and L5/S1 transforaminal RICHMOND on 11/21/2022 with no significant relief.     Spine surgeries:  Cervical surgery x2, lumbar surgery x3, the last was about 1.5 years ago.    Antineuropathics:  Has failed gabapentin and Lyrica in the past  NSAIDs:  Physical therapy:  Antidepressants: Amitryptiline no relief, Celexa 20 mg, BuSpar 10 mg, trazodone 50 mg  Muscle relaxers:    Opioids:  Antiplatelets/Anticoagulants:        Lab Results   Component Value Date    HGBA1C 5.0 06/01/2023       Lab Results   Component Value Date    WBC 4.58 01/02/2025    HGB 13.0 (L) 01/02/2025    HCT  38.6 (L) 01/02/2025    MCV 89 01/02/2025     (L) 01/02/2025             Past Medical History:   Diagnosis Date    Aortic root dilation 04/17/2023 4/23  CT-4.1 cm      Arthritis     Elevated coronary artery calcium score 07/19/2024 4/23  The total coronary calcium score is 1237      2023     Coronary angiography:    Right dominant coronary system   Left main.  Large in caliber, mild calcification, no stenosis   Left anterior descending.  Large in caliber, moderate calcification, mild luminal irregularities.  D1 is a large caliber vessel, mild calcification, mild luminal irregularities.    Ramus:  Medium in caliber, mild calc    Glaucoma     Hypertension     Other ulcerative colitis with rectal bleeding     Resistant hypertension 07/19/2024    Sleep apnea     USES C-PAP       Past Surgical History:   Procedure Laterality Date    BACK SURGERY  2008 2008-2017; cervical fusion x2 & lumbar fusion x3    INCONTINENCE SURGERY      Urolift    INJECTION OF ANESTHETIC AGENT AROUND MEDIAL BRANCH NERVES INNERVATING LUMBAR FACET JOINT Bilateral 12/5/2024    Procedure: Block-nerve-medial branch-lumbar Bilat L3/4 and L5/S1;  Surgeon: Diogo Travis MD;  Location: [facility] OR;  Service: Pain Management;  Laterality: Bilateral;  Bilat L3/4 and L5/S1 priority normal    LEFT HEART CATHETERIZATION  9/21/2023    Procedure: Left heart cath;  Surgeon: Lakeisha Mcdaniel MD;  Location: [facility] CATH;  Service: Cardiology;;    REPAIR OF MENISCUS OF KNEE Left 2010    ROTATOR CUFF REPAIR Right 1997    SURGICAL REMOVAL OF BONE SPUR Right 2/28/2024    Procedure: EXCISION, BONE SPUR- 5th metatarsal;  Surgeon: Emmanuel Liao DPM;  Location: [facility] OR;  Service: Podiatry;  Laterality: Right;    TRANSFORAMINAL EPIDURAL INJECTION OF STEROID Left 10/21/2022    Procedure: Injection,steroid,epidural,transforaminal approach L4/5+L5/S1;  Surgeon: Diogo Travis MD;  Location: [facility] OR;  Service: Pain Management;  Laterality: Left;        Social History     Socioeconomic History    Marital status:    Tobacco Use    Smoking status: Former     Types: Cigars    Smokeless tobacco: Never   Substance and Sexual Activity    Alcohol use: Not Currently    Drug use: Not Currently     Social Drivers of Health     Financial Resource Strain: Low Risk  (2/13/2025)    Overall Financial Resource Strain (CARDIA)     Difficulty of Paying Living Expenses: Not very hard   Food Insecurity: No Food Insecurity (2/13/2025)    Hunger Vital Sign     Worried About Running Out of Food in the Last Year: Never true     Ran Out of Food in the Last Year: Never true   Transportation Needs: No Transportation Needs (2/13/2025)    PRAPARE - Transportation     Lack of Transportation (Medical): No     Lack of Transportation (Non-Medical): No   Physical Activity: Sufficiently Active (2/13/2025)    Exercise Vital Sign     Days of Exercise per Week: 5 days     Minutes of Exercise per Session: 50 min   Stress: No Stress Concern Present (2/13/2025)    [employer] [employer] - Occupational Stress Questionnaire     Feeling of Stress : Not at all   Housing Stability: Low Risk  (2/13/2025)    Housing Stability Vital Sign     Unable to Pay for Housing in the Last Year: No     Number of Times Moved in the Last Year: 0     Homeless in the Last Year: No         Medications/Allergies: See med card    Vitals:    07/14/25 0942   BP: (!) 166/89   Pulse: 60   Weight: 92.8 kg (204 lb 7.6 oz)   PainSc:   4   PainLoc: Back       Body mass index is 31.09 kg/m².    Physical exam:  Gen: A and O x3, pleasant, well-groomed  Musculoskeletal: No antalgic gait.      Neuro:  Motor:        Iliopsoas Quadriceps Knee  Flexion Tibialis  anterior Gastro- cnemius EHL   Lower: R 5/5 5/5 5/5 5/5 5/5 5/5    L 5/5 5/5 5/5 5/5 5/5 4/5      Left  Right    Patellar DTR 1+ 1+   Achilles DTR 0+ 1+   Triceps DTR 1+ 1+   Biceps DTR 2+ 2+   Brachioradialis DTR 2+ 2+     Intact and symmetrical to light  touch and pinprick in L1-S1 dermatomes bilaterally.    Lumbar spine:  Lumbar spine: ROM is   Moderately reduced with flexion extension and oblique extension with increased pain in the right leg with extension.    Olaf's test causes no increased pain on either side.    Supine straight leg raise is  positive for worsening right leg pain Internal and external rotation of the hip causes no increased pain on either side.  Myofascial exam: No tenderness to palpation across lumbar paraspinous muscles.    Imagin22 MRI L-spine:  There is a minimal retrolisthesis of L3 on L4.  There are no acute fractures.  There is a mild chronic compression of the superior endplate of L3.   There are postoperative changes from anterior interbody lumbar fusion L4-5 disc space.  There is presence of a spacer in a satisfactory position.  Decompressive laminectomies with resection of the spinous process of L4 and L5.   The tip of the conus medullaris is at L1 level.  Mild dextroscoliosis of the mid to upper lumbar spine.  Paraspinal soft tissues are unremarkable.   L5-S1: Disc degeneration with disc space narrowing.  There is a broad-based posterior osteophyte and disc complex greater on the right compared to the left with mild compression of thecal sac greater on the right.  No significant central canal spinal stenosis.  There is facet arthropathy decompressive laminectomy noted.  No abnormal intradural enhancement.  The in the far lateral right neural foramen there is an osteophyte and a disc bulge complex that contacts the inferior surface of the exiting right L5 root (image 10 series 3).  Clinical correlation for right L5 radiculopathy suggested.  Left neural foramen is unremarkable.   L4-5: Postoperative changes from anterior interbody lumbar fusion.  No recurrent disc herniations or central canal spinal stenosis.  There is enhancing scar the in the right lateral recess.  Stable postoperative changes from decompressive  laminectomy.  Degenerative facet arthropathy bilaterally.   L3-4: Approximately 2 mm retrolisthesis of L3 on L4.  Disc space narrowing associated with disc degeneration.  There is a broad-based central and left paracentral disc protrusion/herniation resulting in left lateral recess stenosis (image 28 series 12.  There is also at least a moderate left L3-4 foraminal stenosis.  Compression of the exiting left L3 root in the neural foramen is not excluded.  Right neural foramen is unremarkable.  There is mild facet arthropathy.   L2-3: Disc degeneration with circumferential annular disc bulge.  No significant disc herniations or significant central canal spinal stenosis.  There is facet arthropathy.   L1-2: There is a mild left paracentral disc bulge/protrusion.  No significant central canal spinal stenosis.  There is facet arthropathy.  Neural foramina are unremarkable.   T12-L1: Disc degeneration with disc space narrowing.  In addition to the circumferential mild annular disc bulge there is a left paracentral disc protrusion/disc herniation resulting in at least a mild left lateral recess stenosis (image 4 series 12).  Mild bilateral foraminal stenosis.     5/17/22 Xray L-spine:  There are no prior studies for comparison at this time.  There are postoperative changes from anterior interbody lumbar fusion.  Hardware in satisfactory position.  No subluxations is noted.   On flexion and extension radiographs there is no significant instability at all intervertebral disc spaces.   here is mild depression of the superior endplate of L3, likely from a chronic mild compression fracture.  There is degenerative disc disease also at the L2-3 disc space and L3-4 and L5-S1 disc spaces.  Mild marginal anterior spondylotic osteophytes throughout the lumbar spine.   There is calcific atherosclerotic changes of the abdominal aorta.  There is also disc degeneration at the T11-T12 disc space associated with circumferential mild  annular disc bulge    10/03/2024 MRI L-spine  T12-L1: Trace retrolisthesis of T12 on L1.  Mild disc bulge.  Mild narrowing of the bilateral lateral recesses, unchanged.  No significant spinal canal stenosis.  Mild bilateral foraminal stenosis, unchanged.  L1-L2: Mild disc bulge.  Mild narrowing of the bilateral lateral recesses, unchanged.  No significant spinal canal stenosis.  Mild bilateral foraminal stenosis, unchanged.  L2-L3: Mild disc bulge. Mild right and minimal left facet hypertrophy. Ligamentum flavum thickening.  Mild narrowing of the bilateral lateral recesses, unchanged.  No significant spinal canal stenosis.  Mild right and minimal left foraminal stenosis, unchanged.  L3-L4: Retrolisthesis.  Mild disc bulge.  Mild bilateral facet hypertrophy.  Ligamentum flavum thickening.  Mild-moderate narrowing of the bilateral lateral recesses, slightly progressed on the right.  No significant spinal canal stenosis.  Mild-moderate left and mild right foraminal stenosis, unchanged.  L4-L5: Fused.  Laminectomy.  No significant spinal canal stenosis.  Mild-moderate left and mild right foraminal stenosis, unchanged.  L5-S1: Laminectomy.  Trace retrolisthesis.  Mild disc bulge, eccentric to the right.  Mild-moderate narrowing the right lateral recess.  No significant spinal canal stenosis.  Mild-moderate right foraminal stenosis, unchanged.    Assessment:  Chau Price is a 69 y.o. year old male patient who has a past medical history of Other ulcerative colitis with rectal bleeding. He presents in referral from Dr. René Christine for back and leg pain.    1. Lumbar radiculopathy  oxyCODONE-acetaminophen (PERCOCET)  mg per tablet      2. Lumbar spondylosis        3. History of lumbar spinal fusion                  Plan:  Assessment & Plan    1.  We discussed his symptoms which are new.  While he does have chronic low back pain, he feels that he can actually tolerate the symptoms but the new right leg pain  is debilitating.  He has significant foraminal narrowing out to the right side at L5/S1, discussed setting up a transforaminal injection and he would like to proceed.  I will set him up for a right L5/S1 transforaminal injection have him follow up in several weeks after the injection.  In the meantime, even hydrocodone is not helping his pain, gabapentin not helping.  I have given him a prescription for Percocet to take to at least help him sleep.    This note was generated with the assistance of ambient listening technology. Verbal consent was obtained by the patient and accompanying visitor(s) for the recording of patient appointment to facilitate this note. I attest to having reviewed and edited the generated note for accuracy, though some syntax or spelling errors may persist. Please contact the author of this note for any clarification.

## 2025-07-14 NOTE — TELEPHONE ENCOUNTER
Spoke with patient and scheduled procedure with Dr. Olivera and follow up with Dr. Travis per patient request. Let him know to hold ASA for 5 days prior. Message sent to Dr. Choe

## 2025-07-15 NOTE — TELEPHONE ENCOUNTER
Spoke with patient and let them know we received cardiac clearance for them to hold their anticoagulants

## 2025-07-21 ENCOUNTER — HOSPITAL ENCOUNTER (OUTPATIENT)
Dept: RADIOLOGY | Facility: HOSPITAL | Age: 70
Discharge: HOME | End: 2025-07-21
Attending: STUDENT IN AN ORGANIZED HEALTH CARE EDUCATION/TRAINING PROGRAM
Payer: MEDICARE

## 2025-07-21 ENCOUNTER — HOSPITAL ENCOUNTER (OUTPATIENT)
Facility: HOSPITAL | Age: 70
Discharge: HOME | End: 2025-07-21
Attending: STUDENT IN AN ORGANIZED HEALTH CARE EDUCATION/TRAINING PROGRAM | Admitting: STUDENT IN AN ORGANIZED HEALTH CARE EDUCATION/TRAINING PROGRAM
Payer: MEDICARE

## 2025-07-21 DIAGNOSIS — M54.50 LOWER BACK PAIN: ICD-10-CM

## 2025-07-21 DIAGNOSIS — M54.16 LUMBAR RADICULOPATHY: ICD-10-CM

## 2025-07-21 PROCEDURE — 64483 NJX AA&/STRD TFRM EPI L/S 1: CPT | Mod: PO,RT | Performed by: STUDENT IN AN ORGANIZED HEALTH CARE EDUCATION/TRAINING PROGRAM

## 2025-07-21 PROCEDURE — 25000003 PHARM REV CODE 250: Mod: PO | Performed by: STUDENT IN AN ORGANIZED HEALTH CARE EDUCATION/TRAINING PROGRAM

## 2025-07-21 PROCEDURE — 25500020 PHARM REV CODE 255: Mod: PO | Performed by: STUDENT IN AN ORGANIZED HEALTH CARE EDUCATION/TRAINING PROGRAM

## 2025-07-21 PROCEDURE — 64483 NJX AA&/STRD TFRM EPI L/S 1: CPT | Mod: RT,,, | Performed by: STUDENT IN AN ORGANIZED HEALTH CARE EDUCATION/TRAINING PROGRAM

## 2025-07-21 PROCEDURE — 63600175 PHARM REV CODE 636 W HCPCS: Mod: PO | Performed by: STUDENT IN AN ORGANIZED HEALTH CARE EDUCATION/TRAINING PROGRAM

## 2025-07-21 PROCEDURE — A4216 STERILE WATER/SALINE, 10 ML: HCPCS | Mod: PO | Performed by: STUDENT IN AN ORGANIZED HEALTH CARE EDUCATION/TRAINING PROGRAM

## 2025-07-21 RX ORDER — SODIUM CHLORIDE 9 MG/ML
INJECTION, SOLUTION INTRAMUSCULAR; INTRAVENOUS; SUBCUTANEOUS
Status: DISCONTINUED | OUTPATIENT
Start: 2025-07-21 | End: 2025-07-21 | Stop reason: HOSPADM

## 2025-07-21 RX ORDER — LIDOCAINE HYDROCHLORIDE 10 MG/ML
INJECTION, SOLUTION EPIDURAL; INFILTRATION; INTRACAUDAL; PERINEURAL
Status: DISCONTINUED | OUTPATIENT
Start: 2025-07-21 | End: 2025-07-21 | Stop reason: HOSPADM

## 2025-07-21 RX ORDER — ALPRAZOLAM 0.5 MG/1
1 TABLET, ORALLY DISINTEGRATING ORAL ONCE AS NEEDED
Status: COMPLETED | OUTPATIENT
Start: 2025-07-21 | End: 2025-07-21

## 2025-07-21 RX ORDER — DEXAMETHASONE SODIUM PHOSPHATE 10 MG/ML
INJECTION, SOLUTION INTRA-ARTICULAR; INTRALESIONAL; INTRAMUSCULAR; INTRAVENOUS; SOFT TISSUE
Status: DISCONTINUED | OUTPATIENT
Start: 2025-07-21 | End: 2025-07-21 | Stop reason: HOSPADM

## 2025-07-21 RX ADMIN — ALPRAZOLAM 1 MG: 0.5 TABLET, ORALLY DISINTEGRATING ORAL at 02:07

## 2025-07-21 NOTE — H&P
CC: back pain    HPI: The patient is a 69 y.o. male with a history of back pain here for R TFESI L5-S1. There are no major changes in history and physical from 7/14/25 by Dr. Travis.    Past Medical History:   Diagnosis Date    Aortic root dilation 04/17/2023 4/23  CT-4.1 cm      Arthritis     Elevated coronary artery calcium score 07/19/2024 4/23  The total coronary calcium score is 1237      2023     Coronary angiography:    Right dominant coronary system   Left main.  Large in caliber, mild calcification, no stenosis   Left anterior descending.  Large in caliber, moderate calcification, mild luminal irregularities.  D1 is a large caliber vessel, mild calcification, mild luminal irregularities.    Ramus:  Medium in caliber, mild calc    Glaucoma     Hypertension     Other ulcerative colitis with rectal bleeding     Resistant hypertension 07/19/2024    Sleep apnea     USES C-PAP       Past Surgical History:   Procedure Laterality Date    BACK SURGERY  2008 2008-2017; cervical fusion x2 & lumbar fusion x3    INCONTINENCE SURGERY      Urolift    INJECTION OF ANESTHETIC AGENT AROUND MEDIAL BRANCH NERVES INNERVATING LUMBAR FACET JOINT Bilateral 12/5/2024    Procedure: Block-nerve-medial branch-lumbar Bilat L3/4 and L5/S1;  Surgeon: Diogo Travis MD;  Location: [facility] OR;  Service: Pain Management;  Laterality: Bilateral;  Bilat L3/4 and L5/S1 priority normal    LEFT HEART CATHETERIZATION  9/21/2023    Procedure: Left heart cath;  Surgeon: Lakeisha Mcdaniel MD;  Location: [facility] CATH;  Service: Cardiology;;    REPAIR OF MENISCUS OF KNEE Left 2010    ROTATOR CUFF REPAIR Right 1997    SURGICAL REMOVAL OF BONE SPUR Right 2/28/2024    Procedure: EXCISION, BONE SPUR- 5th metatarsal;  Surgeon: Emmanuel Liao DPM;  Location: [facility] OR;  Service: Podiatry;  Laterality: Right;    TRANSFORAMINAL EPIDURAL INJECTION OF STEROID Left 10/21/2022    Procedure: Injection,steroid,epidural,transforaminal approach L4/5+L5/S1;  " Surgeon: Diogo Travis MD;  Location: [facility];  Service: Pain Management;  Laterality: Left;       Family History   Problem Relation Name Age of Onset    Cancer Mother          lung    Glaucoma Father      Cancer Father          bladder    Macular degeneration Neg Hx      Amblyopia Neg Hx      Blindness Neg Hx      Cataracts Neg Hx      Retinal detachment Neg Hx         Social History     Socioeconomic History    Marital status:    Tobacco Use    Smoking status: Former     Types: Cigars    Smokeless tobacco: Never   Substance and Sexual Activity    Alcohol use: Not Currently    Drug use: Not Currently     Social Drivers of Health     Financial Resource Strain: Low Risk  (2/13/2025)    Overall Financial Resource Strain (CARDIA)     Difficulty of Paying Living Expenses: Not very hard   Food Insecurity: No Food Insecurity (2/13/2025)    Hunger Vital Sign     Worried About Running Out of Food in the Last Year: Never true     Ran Out of Food in the Last Year: Never true   Transportation Needs: No Transportation Needs (2/13/2025)    PRAPARE - Transportation     Lack of Transportation (Medical): No     Lack of Transportation (Non-Medical): No   Physical Activity: Sufficiently Active (2/13/2025)    Exercise Vital Sign     Days of Exercise per Week: 5 days     Minutes of Exercise per Session: 50 min   Stress: No Stress Concern Present (2/13/2025)    [place] [employer] - Occupational Stress Questionnaire     Feeling of Stress : Not at all   Housing Stability: Low Risk  (2/13/2025)    Housing Stability Vital Sign     Unable to Pay for Housing in the Last Year: No     Number of Times Moved in the Last Year: 0     Homeless in the Last Year: No       Current Medications[1]    Review of patient's allergies indicates:   Allergen Reactions    Azathioprine Other (See Comments)     Megacolon, "colon almost burst"    Other reaction(s): Not available    azathioprine    Mercaptopurine Other (See " "Comments)     Megacolon, "colon almost burst"    Metoclopramide hcl     Morphine        Vitals:    07/15/25 1004 07/21/25 1433 07/21/25 1440   BP:  (!) 188/95 (!) 169/92   Pulse:  76    Resp:  16    Temp:  97.3 °F (36.3 °C)    TempSrc:  Skin    SpO2:  99%    Weight: 90.7 kg (200 lb) 90.7 kg (200 lb)    Height: 5' 8" (1.727 m) 5' 8" (1.727 m)        REVIEW OF SYSTEMS:     GENERAL: No weight loss, malaise or fevers.  HEENT:  No recent changes in vision or hearing  NECK: Negative for lumps, no difficulty with swallowing.  RESPIRATORY: Negative for cough, wheezing or shortness of breath, patient denies any recent URI.  CARDIOVASCULAR: Negative for chest pain, leg swelling or palpitations.  GI: Negative for abdominal discomfort, blood in stools or black stools or change in bowel habits.  MUSCULOSKELETAL: See HPI.  SKIN: Negative for lesions, rash, and itching.  PSYCH: No suicidal or homicidal ideations, no current mood disturbances.  HEMATOLOGY/LYMPHOLOGY: Negative for prolonged bleeding, bruising easily or swollen nodes. Patient is not currently taking any anti-coagulants  ENDO: No history of diabetes or thyroid dysfunction  NEURO: No history of syncope, paralysis, seizures or tremors.All other reviewed and negative other than HPI.    Physical exam:  Gen: A and O x3, pleasant, well-groomed  Skin: No rashes or obvious lesions  HEENT: PERRLA, no obvious deformities on ears or in canals. No thyroid masses, trachea midline, no palpable lymph nodes in neck, axilla.  CVS: Regular rate and rhythm, normal S1 and S2, no murmurs.  Resp: Clear to auscultation bilaterally.  Abdomen: Soft, NT/ND, normal bowel sounds present.  Musculoskeletal/Neuro: Moving all extremities    Assessment:  Lumbar radiculopathy  -     Place in Outpatient; Standing  -     Vital signs; Standing  -     Verify informed consent; Standing  -     Notify physician ; Standing  -     Notify physician ; Standing  -     Notify physician (specify); Standing  -     " Notify physician (specify); Standing  -     Notify physician (specify); Standing  -     Diet NPO; Standing  -     alprazolam ODT dissolvable tablet 1 mg    Other orders  -     IP VTE LOW RISK PATIENT; Standing          PLAN: R TFESI L5-S1      This patient has been cleared for surgery in an ambulatory surgical facility    ASA 3,  Mallampatti Score 3  No history of anesthetic complications  Plan for RN IV sedation        [1]   No current facility-administered medications for this encounter.

## 2025-07-21 NOTE — DISCHARGE SUMMARY
Con - Surgery  Discharge Note  Short Stay    Procedure(s) (LRB):  INJECTION, SPINE, LUMBOSACRAL, TRANSFORAMINAL APPROACH L5/S1 (Right)      OUTCOME: Patient tolerated treatment/procedure well without complication and is now ready for discharge.    DISPOSITION: Home or Self Care    FINAL DIAGNOSIS:  <principal problem not specified>    FOLLOWUP: In clinic    DISCHARGE INSTRUCTIONS:    Discharge Procedure Orders   Notify your health care provider if you experience any of the following:  temperature >100.4     Notify your health care provider if you experience any of the following:  severe uncontrolled pain     Notify your health care provider if you experience any of the following:  redness, tenderness, or signs of infection (pain, swelling, redness, odor or green/yellow discharge around incision site)     Activity as tolerated        TIME SPENT ON DISCHARGE: 20 minutes

## 2025-07-21 NOTE — OP NOTE
Patient: Chau Price                                                    MRN: [id number]  : 1955                                              Date of procedure: 2025      Pre Procedure Diagnosis: Lumbar, Lumbosacral radiculopathy    Post Procedure Diagnosis: Same    Procedure:   Transforaminal Epidural Steroid Injection Under Fluoroscopy at Right L5-S1      Attending: Stormy Olivera MD    Local Anesthetic Injected: Lidocaine 1% 6ml    Sedation Medications: None    Estimated Blood Loss: None    Complication: None        Procedure:  After informed consent was obtained, patient was taken to the fluoroscopy suite and placed in a prone position.  The skin was prepped and draped in the usual sterile fashion using chlorhexidine.  Anatomical landmarks were identified with the aid of fluoroscopy, and the skin and subcutaneous tissue overlying the neural foramen was infiltrated with 3mL of 1% Lidocaine using a 25-gauge 1.5 inch needle.  A 22-gauge 5-inch needle was advanced with the aid of fluoroscopy in an oblique view such that the needle tip entered the neural foramen.   Needle placement was confirmed with fluoroscopy in PA and Lateral views.  After a negative aspiration, 0.5mL of contrast was injected.  The contrast delineated the nerve root as well as the epidural spread.  After negative aspiration, an injectate consisting of 0.5mL of 10mg/mL of Dexamethasone, 0.5mL of 1% preservative free lidocaine and 3mL of preservative normal saline was injected.  Patient tolerated the procedure well and all needles were removed intact.  There were no complications.    Patient was observed in recovery and discharged home with written instructions under supervision in stable condition.

## 2025-07-22 VITALS
HEART RATE: 58 BPM | BODY MASS INDEX: 30.31 KG/M2 | HEIGHT: 68 IN | WEIGHT: 200 LBS | SYSTOLIC BLOOD PRESSURE: 141 MMHG | DIASTOLIC BLOOD PRESSURE: 68 MMHG | TEMPERATURE: 97 F | RESPIRATION RATE: 16 BRPM | OXYGEN SATURATION: 99 %

## 2025-08-11 ENCOUNTER — TELEPHONE (OUTPATIENT)
Dept: PAIN MEDICINE | Facility: CLINIC | Age: 70
End: 2025-08-11
Payer: MEDICARE

## 2025-08-11 ENCOUNTER — OFFICE VISIT (OUTPATIENT)
Dept: PAIN MEDICINE | Facility: CLINIC | Age: 70
End: 2025-08-11
Payer: MEDICARE

## 2025-08-11 VITALS
BODY MASS INDEX: 31.38 KG/M2 | WEIGHT: 206.38 LBS | HEART RATE: 62 BPM | SYSTOLIC BLOOD PRESSURE: 135 MMHG | DIASTOLIC BLOOD PRESSURE: 79 MMHG

## 2025-08-11 DIAGNOSIS — M54.16 LUMBAR RADICULOPATHY: Primary | ICD-10-CM

## 2025-08-11 DIAGNOSIS — Z98.1 HISTORY OF LUMBAR SPINAL FUSION: ICD-10-CM

## 2025-08-11 PROCEDURE — 99214 OFFICE O/P EST MOD 30 MIN: CPT | Mod: S$PBB,,, | Performed by: ANESTHESIOLOGY

## 2025-08-11 PROCEDURE — 99999 PR PBB SHADOW E&M-EST. PATIENT-LVL II: CPT | Mod: PBBFAC,,, | Performed by: ANESTHESIOLOGY

## 2025-08-11 PROCEDURE — 99212 OFFICE O/P EST SF 10 MIN: CPT | Mod: PBBFAC,PO | Performed by: ANESTHESIOLOGY

## 2025-08-12 ENCOUNTER — PATIENT MESSAGE (OUTPATIENT)
Dept: PAIN MEDICINE | Facility: CLINIC | Age: 70
End: 2025-08-12
Payer: MEDICARE

## 2025-08-12 DIAGNOSIS — M54.16 LUMBAR RADICULOPATHY: Primary | ICD-10-CM

## 2025-08-12 RX ORDER — ALPRAZOLAM 1 MG/1
1 TABLET, ORALLY DISINTEGRATING ORAL ONCE AS NEEDED
OUTPATIENT
Start: 2025-08-12 | End: 2037-01-08

## 2025-08-13 RX ORDER — GABAPENTIN 300 MG/1
600 CAPSULE ORAL 2 TIMES DAILY
Qty: 180 CAPSULE | Refills: 11 | Status: SHIPPED | OUTPATIENT
Start: 2025-08-13 | End: 2026-08-13

## 2025-08-29 ENCOUNTER — HOSPITAL ENCOUNTER (OUTPATIENT)
Facility: HOSPITAL | Age: 70
Discharge: HOME | End: 2025-08-29
Attending: ANESTHESIOLOGY | Admitting: ANESTHESIOLOGY
Payer: MEDICARE

## 2025-08-29 ENCOUNTER — HOSPITAL ENCOUNTER (OUTPATIENT)
Dept: RADIOLOGY | Facility: HOSPITAL | Age: 70
Discharge: HOME | End: 2025-08-29
Attending: ANESTHESIOLOGY | Admitting: ANESTHESIOLOGY
Payer: MEDICARE

## 2025-08-29 VITALS
HEART RATE: 53 BPM | TEMPERATURE: 97 F | BODY MASS INDEX: 30.46 KG/M2 | HEIGHT: 68 IN | OXYGEN SATURATION: 99 % | SYSTOLIC BLOOD PRESSURE: 141 MMHG | DIASTOLIC BLOOD PRESSURE: 78 MMHG | RESPIRATION RATE: 15 BRPM | WEIGHT: 201 LBS

## 2025-08-29 DIAGNOSIS — M54.16 LUMBAR RADICULOPATHY: ICD-10-CM

## 2025-08-29 PROCEDURE — 25000003 PHARM REV CODE 250: Mod: PO | Performed by: ANESTHESIOLOGY

## 2025-08-29 PROCEDURE — 63600175 PHARM REV CODE 636 W HCPCS: Mod: JZ,TB,PO | Performed by: ANESTHESIOLOGY

## 2025-08-29 PROCEDURE — 64483 NJX AA&/STRD TFRM EPI L/S 1: CPT | Mod: RT,,, | Performed by: ANESTHESIOLOGY

## 2025-08-29 PROCEDURE — 25500020 PHARM REV CODE 255: Mod: PO | Performed by: ANESTHESIOLOGY

## 2025-08-29 PROCEDURE — 64483 NJX AA&/STRD TFRM EPI L/S 1: CPT | Mod: PO,RT | Performed by: ANESTHESIOLOGY

## 2025-08-29 RX ORDER — LIDOCAINE HYDROCHLORIDE 10 MG/ML
INJECTION, SOLUTION EPIDURAL; INFILTRATION; INTRACAUDAL; PERINEURAL
Status: DISCONTINUED | OUTPATIENT
Start: 2025-08-29 | End: 2025-08-29 | Stop reason: HOSPADM

## 2025-08-29 RX ORDER — METHYLPREDNISOLONE ACETATE 40 MG/ML
INJECTION, SUSPENSION INTRA-ARTICULAR; INTRALESIONAL; INTRAMUSCULAR; SOFT TISSUE
Status: DISCONTINUED | OUTPATIENT
Start: 2025-08-29 | End: 2025-08-29 | Stop reason: HOSPADM

## 2025-08-29 RX ORDER — METHYLPREDNISOLONE ACETATE 80 MG/ML
INJECTION, SUSPENSION INTRA-ARTICULAR; INTRALESIONAL; INTRAMUSCULAR; SOFT TISSUE
Status: DISCONTINUED | OUTPATIENT
Start: 2025-08-29 | End: 2025-08-29 | Stop reason: HOSPADM

## 2025-08-29 RX ORDER — ALPRAZOLAM 0.5 MG/1
1 TABLET, ORALLY DISINTEGRATING ORAL ONCE AS NEEDED
Status: COMPLETED | OUTPATIENT
Start: 2025-08-29 | End: 2025-08-29

## 2025-08-29 RX ADMIN — ALPRAZOLAM 1 MG: 0.5 TABLET, ORALLY DISINTEGRATING ORAL at 12:08

## (undated) DEVICE — TRAY NERVE BLOCK

## (undated) DEVICE — TOWEL OR DISP STRL BLUE 4/PK

## (undated) DEVICE — SKIN MARKER STER DUAL TIP

## (undated) DEVICE — MARKER SKIN RULER STERILE

## (undated) DEVICE — CHLORAPREP 10.5 ML APPLICATOR

## (undated) DEVICE — NDL SPINAL SPINOCAN 22GX3.5

## (undated) DEVICE — MARKER SKIN STND TIP BLUE BARR

## (undated) DEVICE — GLOVE SENSICARE PI ALOE 6

## (undated) DEVICE — APPLICATOR CHLORAPREP CLR 10.5

## (undated) DEVICE — GLOVE SURGICAL LATEX SZ 7